# Patient Record
Sex: FEMALE | Race: WHITE | Employment: FULL TIME | URBAN - METROPOLITAN AREA
[De-identification: names, ages, dates, MRNs, and addresses within clinical notes are randomized per-mention and may not be internally consistent; named-entity substitution may affect disease eponyms.]

---

## 2017-04-17 PROBLEM — Z34.00 SUPERVISION OF NORMAL FIRST PREGNANCY (HCC): Status: ACTIVE | Noted: 2017-04-17

## 2017-04-17 PROBLEM — Z34.00 SUPERVISION OF NORMAL FIRST PREGNANCY: Status: ACTIVE | Noted: 2017-04-17

## 2017-04-17 PROCEDURE — 86762 RUBELLA ANTIBODY: CPT | Performed by: OBSTETRICS & GYNECOLOGY

## 2017-04-17 PROCEDURE — 87340 HEPATITIS B SURFACE AG IA: CPT | Performed by: OBSTETRICS & GYNECOLOGY

## 2017-04-17 PROCEDURE — 85025 COMPLETE CBC W/AUTO DIFF WBC: CPT | Performed by: OBSTETRICS & GYNECOLOGY

## 2017-04-17 PROCEDURE — 87389 HIV-1 AG W/HIV-1&-2 AB AG IA: CPT | Performed by: OBSTETRICS & GYNECOLOGY

## 2017-04-17 PROCEDURE — 36415 COLL VENOUS BLD VENIPUNCTURE: CPT | Performed by: OBSTETRICS & GYNECOLOGY

## 2017-04-17 PROCEDURE — 86850 RBC ANTIBODY SCREEN: CPT | Performed by: OBSTETRICS & GYNECOLOGY

## 2017-04-17 PROCEDURE — 87086 URINE CULTURE/COLONY COUNT: CPT | Performed by: OBSTETRICS & GYNECOLOGY

## 2017-04-17 PROCEDURE — 81405 MOPATH PROCEDURE LEVEL 6: CPT | Performed by: OBSTETRICS & GYNECOLOGY

## 2017-04-17 PROCEDURE — 86901 BLOOD TYPING SEROLOGIC RH(D): CPT | Performed by: OBSTETRICS & GYNECOLOGY

## 2017-04-17 PROCEDURE — 86780 TREPONEMA PALLIDUM: CPT | Performed by: OBSTETRICS & GYNECOLOGY

## 2017-04-17 PROCEDURE — 87591 N.GONORRHOEAE DNA AMP PROB: CPT | Performed by: OBSTETRICS & GYNECOLOGY

## 2017-04-17 PROCEDURE — 86900 BLOOD TYPING SEROLOGIC ABO: CPT | Performed by: OBSTETRICS & GYNECOLOGY

## 2017-04-17 PROCEDURE — 87491 CHLMYD TRACH DNA AMP PROBE: CPT | Performed by: OBSTETRICS & GYNECOLOGY

## 2017-05-10 PROBLEM — D56.0 ALPHA THALASSEMIA (HCC): Status: ACTIVE | Noted: 2017-05-10

## 2017-08-28 PROCEDURE — 82950 GLUCOSE TEST: CPT | Performed by: OBSTETRICS & GYNECOLOGY

## 2017-08-28 PROCEDURE — 86780 TREPONEMA PALLIDUM: CPT | Performed by: OBSTETRICS & GYNECOLOGY

## 2017-09-18 PROCEDURE — 81403 MOPATH PROCEDURE LEVEL 4: CPT | Performed by: OBSTETRICS & GYNECOLOGY

## 2017-09-18 PROCEDURE — 81404 MOPATH PROCEDURE LEVEL 5: CPT | Performed by: OBSTETRICS & GYNECOLOGY

## 2017-10-12 PROBLEM — D56.3 BETA THALASSEMIA TRAIT: Status: ACTIVE | Noted: 2017-10-12

## 2017-10-26 PROCEDURE — 87081 CULTURE SCREEN ONLY: CPT | Performed by: OBSTETRICS & GYNECOLOGY

## 2017-10-28 PROBLEM — O99.820 GBS (GROUP B STREPTOCOCCUS CARRIER), +RV CULTURE, CURRENTLY PREGNANT (HCC): Status: ACTIVE | Noted: 2017-10-28

## 2017-10-28 PROBLEM — O99.820 GBS (GROUP B STREPTOCOCCUS CARRIER), +RV CULTURE, CURRENTLY PREGNANT: Status: ACTIVE | Noted: 2017-10-28

## 2017-11-25 ENCOUNTER — HOSPITAL ENCOUNTER (INPATIENT)
Facility: HOSPITAL | Age: 27
LOS: 2 days | Discharge: HOME | End: 2017-11-27
Attending: OBSTETRICS & GYNECOLOGY | Admitting: OBSTETRICS & GYNECOLOGY
Payer: COMMERCIAL

## 2017-11-25 PROBLEM — Z34.90 PREGNANCY: Status: ACTIVE | Noted: 2017-11-25

## 2017-11-25 PROBLEM — Z34.90 PREGNANCY (HCC): Status: ACTIVE | Noted: 2017-11-25

## 2017-11-25 PROCEDURE — A4216 STERILE WATER/SALINE, 10 ML: HCPCS | Performed by: OBSTETRICS & GYNECOLOGY

## 2017-11-25 PROCEDURE — 86780 TREPONEMA PALLIDUM: CPT | Performed by: OBSTETRICS & GYNECOLOGY

## 2017-11-25 PROCEDURE — 86900 BLOOD TYPING SEROLOGIC ABO: CPT | Performed by: OBSTETRICS & GYNECOLOGY

## 2017-11-25 PROCEDURE — 86901 BLOOD TYPING SEROLOGIC RH(D): CPT | Performed by: OBSTETRICS & GYNECOLOGY

## 2017-11-25 PROCEDURE — 0KQM0ZZ REPAIR PERINEUM MUSCLE, OPEN APPROACH: ICD-10-PCS | Performed by: OBSTETRICS & GYNECOLOGY

## 2017-11-25 PROCEDURE — 81002 URINALYSIS NONAUTO W/O SCOPE: CPT

## 2017-11-25 PROCEDURE — 85027 COMPLETE CBC AUTOMATED: CPT | Performed by: OBSTETRICS & GYNECOLOGY

## 2017-11-25 PROCEDURE — 86850 RBC ANTIBODY SCREEN: CPT | Performed by: OBSTETRICS & GYNECOLOGY

## 2017-11-25 RX ORDER — HYDROMORPHONE HYDROCHLORIDE 1 MG/ML
1 INJECTION, SOLUTION INTRAMUSCULAR; INTRAVENOUS; SUBCUTANEOUS EVERY 2 HOUR PRN
Status: DISCONTINUED | OUTPATIENT
Start: 2017-11-25 | End: 2017-11-27

## 2017-11-25 RX ORDER — DOCUSATE SODIUM 100 MG/1
100 CAPSULE, LIQUID FILLED ORAL
Status: DISCONTINUED | OUTPATIENT
Start: 2017-11-25 | End: 2017-11-27

## 2017-11-25 RX ORDER — TRISODIUM CITRATE DIHYDRATE AND CITRIC ACID MONOHYDRATE 500; 334 MG/5ML; MG/5ML
30 SOLUTION ORAL AS NEEDED
Status: DISCONTINUED | OUTPATIENT
Start: 2017-11-25 | End: 2017-11-25 | Stop reason: HOSPADM

## 2017-11-25 RX ORDER — ACETAMINOPHEN 325 MG/1
650 TABLET ORAL EVERY 4 HOURS PRN
Status: DISCONTINUED | OUTPATIENT
Start: 2017-11-25 | End: 2017-11-27

## 2017-11-25 RX ORDER — ZOLPIDEM TARTRATE 5 MG/1
5 TABLET ORAL NIGHTLY PRN
Status: DISCONTINUED | OUTPATIENT
Start: 2017-11-25 | End: 2017-11-27

## 2017-11-25 RX ORDER — TERBUTALINE SULFATE 1 MG/ML
0.25 INJECTION, SOLUTION SUBCUTANEOUS AS NEEDED
Status: DISCONTINUED | OUTPATIENT
Start: 2017-11-25 | End: 2017-11-25 | Stop reason: HOSPADM

## 2017-11-25 RX ORDER — IBUPROFEN 600 MG/1
600 TABLET ORAL ONCE AS NEEDED
Status: DISCONTINUED | OUTPATIENT
Start: 2017-11-25 | End: 2017-11-25 | Stop reason: HOSPADM

## 2017-11-25 RX ORDER — IBUPROFEN 600 MG/1
600 TABLET ORAL EVERY 6 HOURS
Status: DISCONTINUED | OUTPATIENT
Start: 2017-11-25 | End: 2017-11-27

## 2017-11-25 RX ORDER — SODIUM CHLORIDE, SODIUM LACTATE, POTASSIUM CHLORIDE, CALCIUM CHLORIDE 600; 310; 30; 20 MG/100ML; MG/100ML; MG/100ML; MG/100ML
INJECTION, SOLUTION INTRAVENOUS CONTINUOUS
Status: DISCONTINUED | OUTPATIENT
Start: 2017-11-25 | End: 2017-11-25 | Stop reason: HOSPADM

## 2017-11-25 RX ORDER — EPHEDRINE SULFATE 50 MG/ML
5 INJECTION, SOLUTION INTRAVENOUS AS NEEDED
Status: DISCONTINUED | OUTPATIENT
Start: 2017-11-25 | End: 2017-11-25

## 2017-11-25 RX ORDER — AMPICILLIN 1 G/1
1 INJECTION, POWDER, FOR SOLUTION INTRAMUSCULAR; INTRAVENOUS EVERY 4 HOURS
Status: DISCONTINUED | OUTPATIENT
Start: 2017-11-25 | End: 2017-11-25 | Stop reason: HOSPADM

## 2017-11-25 RX ORDER — BISACODYL 10 MG
10 SUPPOSITORY, RECTAL RECTAL ONCE AS NEEDED
Status: ACTIVE | OUTPATIENT
Start: 2017-11-25 | End: 2017-11-25

## 2017-11-25 RX ORDER — SIMETHICONE 80 MG
80 TABLET,CHEWABLE ORAL 3 TIMES DAILY PRN
Status: DISCONTINUED | OUTPATIENT
Start: 2017-11-25 | End: 2017-11-27

## 2017-11-25 RX ORDER — NALBUPHINE HCL 10 MG/ML
2.5 AMPUL (ML) INJECTION
Status: DISCONTINUED | OUTPATIENT
Start: 2017-11-25 | End: 2017-11-27

## 2017-11-25 RX ORDER — HYDROCODONE BITARTRATE AND ACETAMINOPHEN 5; 325 MG/1; MG/1
1 TABLET ORAL EVERY 4 HOURS PRN
Status: DISCONTINUED | OUTPATIENT
Start: 2017-11-25 | End: 2017-11-27

## 2017-11-25 RX ORDER — HYDROCODONE BITARTRATE AND ACETAMINOPHEN 5; 325 MG/1; MG/1
2 TABLET ORAL EVERY 4 HOURS PRN
Status: DISCONTINUED | OUTPATIENT
Start: 2017-11-25 | End: 2017-11-27

## 2017-11-25 RX ORDER — DEXTROSE, SODIUM CHLORIDE, SODIUM LACTATE, POTASSIUM CHLORIDE, AND CALCIUM CHLORIDE 5; .6; .31; .03; .02 G/100ML; G/100ML; G/100ML; G/100ML; G/100ML
INJECTION, SOLUTION INTRAVENOUS AS NEEDED
Status: DISCONTINUED | OUTPATIENT
Start: 2017-11-25 | End: 2017-11-25 | Stop reason: HOSPADM

## 2017-11-25 NOTE — PLAN OF CARE
Pt is a  at 40.2 weeks here for r/o labor. Pt states contractions started yesterday and have been getting stronger. Pt denies any pregnancy/medical complications. Pt states she is GBBS+. Denies leaking of fluid or vaginal bleeding.  at bedside.

## 2017-11-25 NOTE — PLAN OF CARE
Dr Michelle Blake updated on patient status. Orders rec'd. Plan of care discussed with patient, patient verbalizes understanding and is in agreement.

## 2017-11-25 NOTE — PROGRESS NOTES
SUBJECTIVE:   pt without complaints. Comfortable s/p epidural    OBJECTIVE:  VS:  height is 5' 4\" (1.626 m) and weight is 131 lb (59.4 kg). Her temporal temperature is 98 °F (36.7 °C). Her blood pressure is 97/54 and her pulse is 77.  Her respiration is 1

## 2017-11-25 NOTE — H&P
[address] and Delivery Prenatal History and Physical Interval Addendum  Please see full Prenatal Record for this pregnancy      SUBJECTIVE:    Interval History:      This is a pregnancy at 40 weeks admitted for early labor  Contractions increas

## 2017-11-25 NOTE — PROGRESS NOTES
Report received from [facility]. Patient resting comfortably in bed. POC discussed with patient. All questions answered. Patient verbalized understanding. Call light within reach.

## 2017-11-25 NOTE — L&D DELIVERY NOTE
Foreign Grew, Girl  [[id number]]     Labor Events     labor?:  No    steroids?:  None   Antibiotics received during labor?:  Yes   Antibiotics (enter # doses in comment):  ampicillin         Rupture type:   Intact   Fluid color:  Meconium   Inductio Appearance:  Intact   Disposition:  held for future pathology          Apgars    Living status:  Living   Apgar Scoring Key:     0 1 2    Skin color Blue or pale Acrocyanotic Completely pink    Heart rate Absent <100 bpm >100 bpm    Reflex irritability N

## 2017-11-25 NOTE — PROGRESS NOTES
Report received from [facility] and endorsed to [employer].   Pt unable to void prior to transfer to /b.

## 2017-11-26 PROCEDURE — 85025 COMPLETE CBC W/AUTO DIFF WBC: CPT | Performed by: OBSTETRICS & GYNECOLOGY

## 2017-11-26 NOTE — PROGRESS NOTES
S: pt without complaints.   Lochia light  O: VS-Temp:  [97.8 °F (36.6 °C)-98.7 °F (37.1 °C)] 97.8 °F (36.6 °C)  Pulse:  [] 92  Resp:  [16-18] 18  BP: ()/(54-91) 125/91       Abdomen- soft ND NT, uterus firm and contracted       Extremities- 1+ e

## 2017-11-26 NOTE — PROGRESS NOTES
Patient admitted to MB via Candelario Fernando to room  Safety precautions initiated  Bed in low position  Call light in reach  Knows to call for assistance first time out of bed

## 2017-11-27 VITALS
TEMPERATURE: 98 F | HEART RATE: 60 BPM | WEIGHT: 131 LBS | RESPIRATION RATE: 18 BRPM | BODY MASS INDEX: 22.36 KG/M2 | HEIGHT: 64 IN | SYSTOLIC BLOOD PRESSURE: 115 MMHG | DIASTOLIC BLOOD PRESSURE: 76 MMHG | OXYGEN SATURATION: 95 %

## 2017-11-27 NOTE — PLAN OF CARE
POSTPARTUM    • Long Term Goal:Experiences normal postpartum course Adequate for Discharge    • Optimize infant feeding at the breast Adequate for Discharge    • Appropriate maternal -  bonding Adequate for Discharge        WILL DISCUSS PLAN OF CARE

## 2017-11-27 NOTE — PROGRESS NOTES
Postpartum Progress Note    SUBJECTIVE:  Postpartum day #2 s/p     No overnight events. Patient doing well without complaints. Ambulating, tolerating PO, voiding, pain well controlled.       OBJECTIVE:    Vital signs in last 24 hours:  Temp:  [97.9 °F

## 2017-11-29 ENCOUNTER — TELEPHONE (OUTPATIENT)
Dept: OBGYN UNIT | Facility: HOSPITAL | Age: 27
End: 2017-11-29

## 2017-11-29 NOTE — PROGRESS NOTES
Reviewed self and infant care w / mom, she verbalizes understanding of instructions reviewed. Encourage to follow up w/ MDs as directed and w/ questions/concerns.  Enc to go to ct

## 2017-12-01 PROBLEM — O99.820 GBS (GROUP B STREPTOCOCCUS CARRIER), +RV CULTURE, CURRENTLY PREGNANT (HCC): Status: RESOLVED | Noted: 2017-10-28 | Resolved: 2017-12-01

## 2017-12-01 PROBLEM — O99.820 GBS (GROUP B STREPTOCOCCUS CARRIER), +RV CULTURE, CURRENTLY PREGNANT: Status: RESOLVED | Noted: 2017-10-28 | Resolved: 2017-12-01

## 2017-12-01 PROBLEM — Z34.00 SUPERVISION OF NORMAL FIRST PREGNANCY (HCC): Status: RESOLVED | Noted: 2017-04-17 | Resolved: 2017-12-01

## 2017-12-01 PROBLEM — D56.0 ALPHA THALASSEMIA (HCC): Status: RESOLVED | Noted: 2017-05-10 | Resolved: 2017-12-01

## 2017-12-01 PROBLEM — Z34.00 SUPERVISION OF NORMAL FIRST PREGNANCY: Status: RESOLVED | Noted: 2017-04-17 | Resolved: 2017-12-01

## 2017-12-17 ENCOUNTER — NURSE ONLY (OUTPATIENT)
Dept: LACTATION | Facility: HOSPITAL | Age: 27
End: 2017-12-17
Attending: OBSTETRICS & GYNECOLOGY
Payer: COMMERCIAL

## 2017-12-17 PROCEDURE — 99213 OFFICE O/P EST LOW 20 MIN: CPT

## 2018-01-03 ENCOUNTER — NURSE ONLY (OUTPATIENT)
Dept: LACTATION | Facility: HOSPITAL | Age: 28
End: 2018-01-03
Attending: OBSTETRICS & GYNECOLOGY
Payer: COMMERCIAL

## 2018-01-03 DIAGNOSIS — O92.5 SUPPRESSED LACTATION, POSTPARTUM CONDITION OR COMPLICATION: Primary | ICD-10-CM

## 2018-01-03 PROCEDURE — 99213 OFFICE O/P EST LOW 20 MIN: CPT

## 2018-01-03 PROCEDURE — 99212 OFFICE O/P EST SF 10 MIN: CPT

## 2018-01-03 NOTE — PROGRESS NOTES
LACTATION NOTE - MOTHER      Evaluation Type: Outpatient Follow Up    Problems identified  Problems identified: Knowledge deficit; Nipple pain;Milk supply not WNL; Inverted nipple(s)  Milk supply not WNL: Hypogalactia  Problems Identified Other:  (Infant sti

## 2018-01-03 NOTE — PATIENT INSTRUCTIONS
Suggestions from today's visit:    Today's weight: 9 lb 11 oz, a gain from 8 lb 9 oz from our last visit on 12/17/17. Aurelia Noguera has increased her intake of breast milk from directly nursing since our last visit.  At today's visit, her intake was a total of

## 2018-01-10 PROBLEM — Z34.90 PREGNANCY: Status: RESOLVED | Noted: 2017-11-25 | Resolved: 2018-01-10

## 2018-01-10 PROBLEM — Z34.90 PREGNANCY (HCC): Status: RESOLVED | Noted: 2017-11-25 | Resolved: 2018-01-10

## 2020-03-31 PROBLEM — Z34.80 SUPERVISION OF OTHER NORMAL PREGNANCY, ANTEPARTUM (HCC): Status: ACTIVE | Noted: 2020-03-31

## 2020-03-31 PROBLEM — Z34.80 SUPERVISION OF OTHER NORMAL PREGNANCY, ANTEPARTUM: Status: ACTIVE | Noted: 2020-03-31

## 2020-08-20 PROBLEM — O99.013 ANEMIA DURING PREGNANCY IN THIRD TRIMESTER (HCC): Status: ACTIVE | Noted: 2020-08-20

## 2020-08-20 PROBLEM — O99.013 ANEMIA DURING PREGNANCY IN THIRD TRIMESTER: Status: ACTIVE | Noted: 2020-08-20

## 2020-10-12 DIAGNOSIS — Z34.90 PREGNANCY: Primary | ICD-10-CM

## 2020-10-24 ENCOUNTER — ANESTHESIA (OUTPATIENT)
Dept: OBGYN UNIT | Facility: HOSPITAL | Age: 30
End: 2020-10-24
Payer: COMMERCIAL

## 2020-10-24 ENCOUNTER — ANESTHESIA EVENT (OUTPATIENT)
Dept: OBGYN UNIT | Facility: HOSPITAL | Age: 30
End: 2020-10-24
Payer: COMMERCIAL

## 2020-10-24 ENCOUNTER — HOSPITAL ENCOUNTER (INPATIENT)
Facility: HOSPITAL | Age: 30
LOS: 2 days | Discharge: HOME | End: 2020-10-26
Attending: OBSTETRICS & GYNECOLOGY | Admitting: OBSTETRICS & GYNECOLOGY
Payer: COMMERCIAL

## 2020-10-24 PROBLEM — Z34.90 PREGNANCY (HCC): Status: ACTIVE | Noted: 2020-10-24

## 2020-10-24 PROBLEM — Z34.90 PREGNANCY: Status: ACTIVE | Noted: 2020-10-24

## 2020-10-24 PROCEDURE — 99214 OFFICE O/P EST MOD 30 MIN: CPT

## 2020-10-24 PROCEDURE — 86900 BLOOD TYPING SEROLOGIC ABO: CPT | Performed by: OBSTETRICS & GYNECOLOGY

## 2020-10-24 PROCEDURE — 86901 BLOOD TYPING SEROLOGIC RH(D): CPT | Performed by: OBSTETRICS & GYNECOLOGY

## 2020-10-24 PROCEDURE — 86850 RBC ANTIBODY SCREEN: CPT | Performed by: OBSTETRICS & GYNECOLOGY

## 2020-10-24 PROCEDURE — 86780 TREPONEMA PALLIDUM: CPT | Performed by: OBSTETRICS & GYNECOLOGY

## 2020-10-24 PROCEDURE — 85025 COMPLETE CBC W/AUTO DIFF WBC: CPT | Performed by: OBSTETRICS & GYNECOLOGY

## 2020-10-24 RX ORDER — TERBUTALINE SULFATE 1 MG/ML
0.25 INJECTION, SOLUTION SUBCUTANEOUS AS NEEDED
Status: DISCONTINUED | OUTPATIENT
Start: 2020-10-24 | End: 2020-10-24

## 2020-10-24 RX ORDER — BUPIVACAINE HCL/0.9 % NACL/PF 0.25 %
5 PLASTIC BAG, INJECTION (ML) EPIDURAL AS NEEDED
Status: DISCONTINUED | OUTPATIENT
Start: 2020-10-24 | End: 2020-10-24

## 2020-10-24 RX ORDER — SODIUM CHLORIDE, SODIUM LACTATE, POTASSIUM CHLORIDE, CALCIUM CHLORIDE 600; 310; 30; 20 MG/100ML; MG/100ML; MG/100ML; MG/100ML
INJECTION, SOLUTION INTRAVENOUS CONTINUOUS
Status: DISCONTINUED | OUTPATIENT
Start: 2020-10-24 | End: 2020-10-24

## 2020-10-24 RX ORDER — BISACODYL 10 MG
10 SUPPOSITORY, RECTAL RECTAL ONCE AS NEEDED
Status: DISCONTINUED | OUTPATIENT
Start: 2020-10-24 | End: 2020-10-26

## 2020-10-24 RX ORDER — TRISODIUM CITRATE DIHYDRATE AND CITRIC ACID MONOHYDRATE 500; 334 MG/5ML; MG/5ML
30 SOLUTION ORAL AS NEEDED
Status: DISCONTINUED | OUTPATIENT
Start: 2020-10-24 | End: 2020-10-24

## 2020-10-24 RX ORDER — DIPHENHYDRAMINE HYDROCHLORIDE 50 MG/ML
12.5 INJECTION INTRAMUSCULAR; INTRAVENOUS EVERY 4 HOURS PRN
Status: DISCONTINUED | OUTPATIENT
Start: 2020-10-24 | End: 2020-10-24

## 2020-10-24 RX ORDER — ACETAMINOPHEN 500 MG
500 TABLET ORAL EVERY 6 HOURS PRN
Status: DISCONTINUED | OUTPATIENT
Start: 2020-10-24 | End: 2020-10-24

## 2020-10-24 RX ORDER — HYDROCODONE BITARTRATE AND ACETAMINOPHEN 5; 325 MG/1; MG/1
1 TABLET ORAL ONCE
Status: COMPLETED | OUTPATIENT
Start: 2020-10-24 | End: 2020-10-24

## 2020-10-24 RX ORDER — IBUPROFEN 600 MG/1
600 TABLET ORAL EVERY 6 HOURS PRN
Status: DISCONTINUED | OUTPATIENT
Start: 2020-10-24 | End: 2020-10-24

## 2020-10-24 RX ORDER — AMMONIA INHALANTS 0.04 G/.3ML
0.3 INHALANT RESPIRATORY (INHALATION) AS NEEDED
Status: DISCONTINUED | OUTPATIENT
Start: 2020-10-24 | End: 2020-10-24

## 2020-10-24 RX ORDER — IBUPROFEN 600 MG/1
600 TABLET ORAL EVERY 6 HOURS
Status: DISCONTINUED | OUTPATIENT
Start: 2020-10-24 | End: 2020-10-26

## 2020-10-24 RX ORDER — ACETAMINOPHEN 325 MG/1
650 TABLET ORAL EVERY 6 HOURS PRN
Status: DISCONTINUED | OUTPATIENT
Start: 2020-10-24 | End: 2020-10-26

## 2020-10-24 RX ORDER — BUPIVACAINE HYDROCHLORIDE 2.5 MG/ML
INJECTION, SOLUTION EPIDURAL; INFILTRATION; INTRACAUDAL AS NEEDED
Status: DISCONTINUED | OUTPATIENT
Start: 2020-10-24 | End: 2020-10-24 | Stop reason: SURG

## 2020-10-24 RX ORDER — ONDANSETRON 2 MG/ML
4 INJECTION INTRAMUSCULAR; INTRAVENOUS EVERY 6 HOURS PRN
Status: DISCONTINUED | OUTPATIENT
Start: 2020-10-24 | End: 2020-10-24

## 2020-10-24 RX ORDER — DOCUSATE SODIUM 100 MG/1
100 CAPSULE, LIQUID FILLED ORAL
Status: DISCONTINUED | OUTPATIENT
Start: 2020-10-24 | End: 2020-10-26

## 2020-10-24 RX ORDER — SIMETHICONE 80 MG
80 TABLET,CHEWABLE ORAL 3 TIMES DAILY PRN
Status: DISCONTINUED | OUTPATIENT
Start: 2020-10-24 | End: 2020-10-26

## 2020-10-24 RX ORDER — DEXTROSE, SODIUM CHLORIDE, SODIUM LACTATE, POTASSIUM CHLORIDE, AND CALCIUM CHLORIDE 5; .6; .31; .03; .02 G/100ML; G/100ML; G/100ML; G/100ML; G/100ML
INJECTION, SOLUTION INTRAVENOUS AS NEEDED
Status: DISCONTINUED | OUTPATIENT
Start: 2020-10-24 | End: 2020-10-24

## 2020-10-24 RX ADMIN — BUPIVACAINE HYDROCHLORIDE 5 ML: 2.5 INJECTION, SOLUTION EPIDURAL; INFILTRATION; INTRACAUDAL at 18:09:00

## 2020-10-24 NOTE — H&P
[address] and Delivery Prenatal History and Physical Interval Addendum  Please see full Prenatal Record for this pregnancy      SUBJECTIVE:    Interval History:      This is a pregnancy at 40w0d weeks admitted for labor    OBJECTIVE:       Feta

## 2020-10-24 NOTE — ANESTHESIA PROCEDURE NOTES
Labor Analgesia  Performed by: Janeth Murcia MD  Authorized by: Janeth Murcia MD       General Information and Staff    Start Time:  10/24/2020 6:17 PM  End Time:  10/24/2020 6:30 PM  Anesthesiologist:  Janeth Murcia MD  Performed by:   Anesthesiologist  Ernie

## 2020-10-24 NOTE — ANESTHESIA PREPROCEDURE EVALUATION
PRE-OP EVALUATION    Patient Name: Mario Luis    Pre-op Diagnosis:  Intrauterine pregancy, in painful labor    For labor epidural analgesia  Pre-op vitals reviewed.   Temp: 97.3 °F (36.3 °C)  Pulse: 100  Resp: 18  BP: 124/76  SpO2: 100 %  Body mass index Cardiovascular    Negative cardiovascular ROS. Exercise tolerance: good     MET: >4                                           Endo/Other    Negative endo/other ROS. Pulmonary    Negative pulmonary ROS. failure of pain relief. Implied distant risks include spinal hematoma and infection. Understanding of risks is demonstrated, and wishes proceed with labor analgesia.   Plan/risks discussed with: patient and spouse                Present on Admission:  **N

## 2020-10-25 PROCEDURE — 85025 COMPLETE CBC W/AUTO DIFF WBC: CPT | Performed by: OBSTETRICS & GYNECOLOGY

## 2020-10-25 NOTE — PROGRESS NOTES
Labor Analgesia Follow Up Note    Patient underwent epidural anesthesia for labor analgesia,    Placenta Date/Time: 10/24/2020  7:00 PM    Delivery Date/Time[de-identified] 10/24/2020  6:58 PM    /73 (BP Location: Left arm)   Pulse 88   Temp 98.3 °F (36.8 °C) (Or

## 2020-10-25 NOTE — PROGRESS NOTES
Postpartum Day 1    Pt without complaints.     Temp:  [97.3 °F (36.3 °C)-98.3 °F (36.8 °C)] 97.9 °F (36.6 °C)  Pulse:  [] 58  Resp:  [16-18] 16  BP: (105-129)/(57-90) 114/77  abd  soft, NT, ND, fundus firm below umbilicus  perineum NL lochia  extr  tr

## 2020-10-25 NOTE — PROGRESS NOTES
Patient admitted via wheelchair to room. ID bands cross matched with L&D rn. Oriented to room. Safety precautions initiated. Bed in low position Call light in reach. D/C teachings initiated bedside.  Plan of care discussed Pt told to call for asst when juan

## 2020-10-25 NOTE — PLAN OF CARE
Problem: Patient/Family Goals  Goal: Patient/Family Long Term Goal  Description: Patient's [address]  - See additional Care Plan goals for specific interventions  Outcome: Completed  Goal: Patient/Family Short Term Goal  Description: José Assess fundus and lochia. - Provide ice/sitz baths for perineum discomfort. - Monitor healing of incision/episiotomy/laceration, and assess for signs and symptoms of infection and hematoma. - Assess bladder function and monitor for bladder distention. pumping equipment/supplies, instructions and assistance, as needed. - Encourage rooming-in and breast feeding on demand.  - Encourage skin-to-skin contact. - Provide LC support as needed. - Assess for and manage engorgement.   - Provide breast feeding ed

## 2020-10-25 NOTE — ADDENDUM NOTE
Addendum  created 10/24/20 2054 by Catrina Gamino MD    Clinical Note Signed, Intraprocedure Blocks edited

## 2020-10-25 NOTE — L&D DELIVERY NOTE
Bashir Basilio, Girl [[id number]]    Labor Events     labor?: No   steroids?: None  Antibiotics received during labor?: No  Antibiotics (enter # doses in comment): none  Rupture date/time: 10/24/2020 1843     Rupture type: AROM  Fluid color: Clear  I motion    Respiratory effort Absent Weak cry; hypoventilation Good, crying              1 Minute:  5 Minute:  10 Minute:  15 Minute:  20 Minute:    Skin color: 1  1       Heart rate: 2  2       Reflex irritablity: 2  2       Muscle tone: 2  2       Respira

## 2020-10-26 VITALS
WEIGHT: 163.63 LBS | DIASTOLIC BLOOD PRESSURE: 68 MMHG | RESPIRATION RATE: 17 BRPM | TEMPERATURE: 98 F | HEIGHT: 64.02 IN | HEART RATE: 69 BPM | OXYGEN SATURATION: 100 % | SYSTOLIC BLOOD PRESSURE: 119 MMHG | BODY MASS INDEX: 27.93 KG/M2

## 2020-10-26 NOTE — PROGRESS NOTES
Discharge patient home as order. Teaching complete, patient feel comfortable to take care herself and  infant. Hugs and kisses off. Sent both mom and infant to their family car @ 5092.

## 2020-10-26 NOTE — PLAN OF CARE
Problem: PAIN - ADULT  Goal: Verbalizes/displays adequate comfort level or patient's stated pain goal  Description: INTERVENTIONS:  - Encourage pt to monitor pain and request assistance  - Assess pain using appropriate pain scale  - Administer analgesics Progressing  Goal: Optimize infant feeding at the breast  Description: INTERVENTIONS:  - Initiate breast feeding within first hour after birth. - Monitor effectiveness of current breast feeding efforts.   - Assess support systems available to mother/famil

## 2020-10-26 NOTE — PROGRESS NOTES
[facility]  Post-Partum Vaginal Delivery Progress Note    Rolene Richard Patient Status:  Inpatient    1990 MRN [id number]   [facility] Attending Toshia John MD   Hosp Day # 2 PCP Clarisse Tello MD     SUBJECTIVE:    Postp

## 2020-10-28 ENCOUNTER — TELEPHONE (OUTPATIENT)
Dept: OBGYN UNIT | Facility: HOSPITAL | Age: 30
End: 2020-10-28

## 2021-02-06 ENCOUNTER — IMMUNIZATION (OUTPATIENT)
Dept: LAB | Age: 31
End: 2021-02-06
Attending: HOSPITALIST
Payer: COMMERCIAL

## 2021-02-06 DIAGNOSIS — Z23 NEED FOR VACCINATION: Primary | ICD-10-CM

## 2021-02-06 PROCEDURE — 0001A SARSCOV2 VAC 30MCG/0.3ML IM: CPT

## 2021-02-28 ENCOUNTER — IMMUNIZATION (OUTPATIENT)
Dept: LAB | Age: 31
End: 2021-02-28
Attending: PREVENTIVE MEDICINE
Payer: COMMERCIAL

## 2021-02-28 DIAGNOSIS — Z23 NEED FOR VACCINATION: Primary | ICD-10-CM

## 2021-02-28 PROCEDURE — 0002A SARSCOV2 VAC 30MCG/0.3ML IM: CPT

## 2022-01-10 NOTE — PATIENT INSTRUCTIONS
Malissa's weight today in diaper only was 8 lbs 9.4 oz  Intake at breast was 24 ml, from left and 26 ml from right = 50 ml (1 2/3 oz)  At her current weight, it is expected that she would need about 2 1/2 to 3 ounces per feeding.   Goals from today's visit a most moms is 10-12 minutes, but pumping times may vary slightly. • A short pumping is better than no pumping!   • If you have time you can “cluster pump” like a baby cluster feeds at times – pump for ten minutes, rest for ten minutes, then repeat 2-3 times to look at while you pump  • Inhale the scent of your baby from something your baby wore. • Sit back, close your eyes and imagine how sweet and soft your baby is; imagine “flowing things” like waterfalls, white rivers. • Listen to relaxing music.  There i 8

## 2023-04-29 ENCOUNTER — HOSPITAL ENCOUNTER (OUTPATIENT)
Age: 33
Discharge: HOME | End: 2023-04-29
Payer: COMMERCIAL

## 2023-04-29 VITALS
TEMPERATURE: 98 F | HEART RATE: 89 BPM | DIASTOLIC BLOOD PRESSURE: 79 MMHG | RESPIRATION RATE: 20 BRPM | SYSTOLIC BLOOD PRESSURE: 124 MMHG | OXYGEN SATURATION: 98 %

## 2023-04-29 DIAGNOSIS — J02.9 VIRAL PHARYNGITIS: Primary | ICD-10-CM

## 2023-04-29 LAB — S PYO AG THROAT QL: NEGATIVE

## 2023-04-29 NOTE — DISCHARGE INSTRUCTIONS
Please take acetaminophen (Tylenol) 650-1000 mg every 6 hours for fever/pain  OR  Ibuprofen (Motrin, Advil) 600 mg every 6 hours for fever/pain, with food  Warm fluids  Throat drops/lozenges e.g. Halls, Cepacol  Warm salt water gargles (1/2 teaspoon of salt to 8 oz of warm water)  Return for any new/worsening symptoms  See your primary care provider if no improvement in 1 week

## 2023-06-01 ENCOUNTER — HOSPITAL ENCOUNTER (OUTPATIENT)
Age: 33
Discharge: HOME | End: 2023-06-01
Payer: COMMERCIAL

## 2023-06-01 VITALS
OXYGEN SATURATION: 100 % | RESPIRATION RATE: 18 BRPM | TEMPERATURE: 98 F | SYSTOLIC BLOOD PRESSURE: 120 MMHG | HEART RATE: 66 BPM | DIASTOLIC BLOOD PRESSURE: 73 MMHG

## 2023-06-01 DIAGNOSIS — J02.9 VIRAL PHARYNGITIS: Primary | ICD-10-CM

## 2023-06-01 LAB — S PYO AG THROAT QL: NEGATIVE

## 2023-06-01 PROCEDURE — 87880 STREP A ASSAY W/OPTIC: CPT | Performed by: NURSE PRACTITIONER

## 2023-06-01 PROCEDURE — 99213 OFFICE O/P EST LOW 20 MIN: CPT | Performed by: NURSE PRACTITIONER

## 2023-12-28 LAB
HEPATITIS B SURFACE ANTIGEN OB: NEGATIVE
HIV RESULT OB: NEGATIVE
RAPID PLASMA REAGIN OB: NONREACTIVE

## 2024-02-15 ENCOUNTER — TELEPHONE (OUTPATIENT)
Dept: PERINATAL CARE | Facility: HOSPITAL | Age: 34
End: 2024-02-15

## 2024-02-15 NOTE — TELEPHONE ENCOUNTER
Returned pt call RE scheduling.  No answer  Left Voice mail to call back with [facility] number  [phone]

## 2024-03-29 NOTE — PROGRESS NOTES
Outpatient Maternal-Fetal Medicine Consultation    Dear Dr. Moncada,    Thank you for requesting ultrasound evaluation and maternal fetal medicine consultation on your patient Audrey Clemens.  As you are aware she is a 34 year old female  with a rogers pregnancy and an Estimated Date of Delivery: 24  A maternal-fetal medicine consultation was requested secondary to Advanced Maternal Age.  Her prenatal records and labs were reviewed.    Her first daughter has Usher type Ib syndrome.  Her hearing loss was diagnosed at 2 years and was progressive.  She has cochlear implants and is doing very well.    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  It is just something that is found on her labs.  ROS    HISTORY  OB History    Para Term  AB Living   3 2 2     2   SAB IAB Ectopic Multiple Live Births         0 2      # Outcome Date GA Lbr Levi/2nd Weight Sex Delivery Anes PTL Lv   3 Current            2 Term 10/24/20 40w0d 06:39 / 00:19 7 lb 5.5 oz (3.33 kg) F NORMAL SPONT EPI N WILLIAM   1 Term 17 40w2d 11:24 / 01:40 7 lb 7.8 oz (3.395 kg) F NORMAL SPONT EPI N WILLIAM      Complications: Group B beta strep +       Allergies:  No Known Allergies   Current Meds:  Current Outpatient Medications   Medication Sig Dispense Refill    prenatal multivitamin plus DHA 27-0.8-228 MG Oral Cap Take 1 capsule by mouth daily.          HISTORY:  Past Medical History:   Diagnosis Date    Blood disorder     beta thalassemia    HEADACHES     controlled on OCPs    IBS     nausea/vomiting    Mental disorder     anxiety, depression. no meds since     Screening-pulmonary TB 2013    negative PPD       Past Surgical History:   Procedure Laterality Date    OTHER SURGICAL HISTORY      oral surgery    OTHER SURGICAL HISTORY      nasal surgery    UPPER GI ENDOSCOPY - REFERRAL      by Dr Ordaz- neg      Family History   Problem Relation Age of Onset    Other (Other) Sister         depression    Other  (Other) Father         dx thyroid d/o age 57 ?hyperactive is on po meds    Cancer Maternal Grandmother         breast CA dx approx age 60    Heart Disorder Maternal Grandfather         CABG age approx 75    Diabetes Other         great uncle    Other (Other) Other       Social History     Socioeconomic History    Marital status:     Number of children: 0   Occupational History    Occupation: [employer]     Comment: psych   Tobacco Use    Smoking status: Former    Smokeless tobacco: Never   Vaping Use    Vaping Use: Never used   Substance and Sexual Activity    Alcohol use: No     Alcohol/week: 0.0 standard drinks of alcohol    Drug use: No    Sexual activity: Not Currently     Partners: Male   Social History Narrative    Working    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         Diet: well balanced, gets enough fruits/veggies    Exercise: runs    Sleep: ok, gets about 6 hrs    Stress: managable--good support        Periods: regular    Last pap 2013 normal    No heavy bleeding/cramping              PHYSICAL EXAMINATION:  /69   Pulse 92   Wt 157 lb (71.2 kg)   BMI 26.94 kg/m²   Physical Exam  Constitutional:       Appearance: Normal appearance.   Abdominal:      Palpations: Abdomen is soft.      Tenderness: There is no abdominal tenderness.   Neurological:      Mental Status: She is alert.   Psychiatric:         Mood and Affect: Mood normal.         Behavior: Behavior normal.           OBSTETRIC ULTRASOUND  The patient had a level II ultrasound today which revealed size consistent with dates and a normal detailed anatomic survey.  Ultrasound Findings:  Single IUP in breech presentation.    Placenta is posterior, right.   A 3 vessel cord is noted.  Cardiac activity is present at 140 bpm   g ( 0 lb 15 oz);   MVP is 4.4 cm .     The fetal measurements are consistent with the established EDC. No ultrasound evidence of  structural abnormalities are seen today. The nasal bone is present. No ultrasound evidence of markers for aneuploidy are seen. She understands that ultrasound exam cannot exclude genetic abnormalities and that genetic testing is recommended. The limitations of ultrasound were discussed.     Uterus and adnexa appeared normal  today on US  See PACS/Imaging Tab For Complete Ultrasound Report  I interpreted the results and reviewed them with the patient.    DISCUSSION  During her visit we discussed and reviewed the following issues:  ADVANCED MATERNAL AGE    Background  I reviewed with the patient that pregnancies in women of advanced maternal age (35 or older at delivery) are associated with elevated risks. Specifically, there is a higher rate of:  Fetal malformations  Preeclampsia  Gestational diabetes  Intrauterine fetal death    As a result, enhanced pregnancy surveillance is advised for these patients including a comprehensive ultrasound to assess for fetal malformations (at 20 weeks) and a third trimester ultrasound assessment for fetal growth (at 32 weeks). In addition, weekly NST's (initiating at 36 weeks gestation for women 35-39 years and at 32 weeks gestation for women 40 years and older) are also advised. Routine obstetric care is more than adequate to assess for gestational diabetes and preeclampsia; hence, no further significant alterations in obstetric care are advised.    Medical Complications    Women 35 years of age or older can expect to experience two to three fold higher rates of hospitalization,  delivery, and pregnancy-related complications when compared to their younger counterparts.  The two most common medical problems complicating these  pregnanccies are hypertension and diabetes.   The incidence of preeclampsia in the general obstetric population is 3 to 4 percent; this increases to 5 to 10 percent in women over age 40 and is as high as 35 percent in women over age 50.   The incidence  of gestational diabetes in the general obstetric population is 3 percent, rising to 7 to 12 percent in women over age 40 and 20 percent in women over age 50.  Women 35 years of age or older are more likely to be delivered by . The  delivery rate in the general obstetric population of the [place] is almost 30 percent, compared to almost 50 percent in women over age 40 to 45 and almost 80 percent in women age 50 to 63.          Fetal Death        A decision analysis tool using data from the [place] [employer]  Database predicted a strategy of weekly antepartum testing and labor induction would lower the risk of unexplained fetal death in women 35 years of age or older. In this model, weekly testing starting at 36 weeks of gestation would drop the risk of fetal death from 5.2 to 1.3 per 1000 pregnancies. While a policy of antepartum testing in older women does increase the chance that a women will be induced (71 inductions per fetal death averted) and thereby increases her risk of having a  delivery, only 14 additional cesareans would need to be performed to avert one unexplained fetal death.  Hence, weekly NST's are advised for women of advanced maternal age; testing should be initiated at 36 weeks for women 35-39 years and at 32 weeks for women 40 years and older.    Fetal Malformations    Cardiac malformations, clubfoot, and diaphragmatic hernia appear to occur with increased frequency in offspring of older women. These abnormalities are structural and unrelated to aneuploidy, thus they would not be detected by karyotype analysis.  For these reasons a complete, detailed ultrasound (level II) is advised even if the fetus has a normal karyotype.      Fetal Aneuploidy      Invasive Testing  I offered invasive genetic testing (amniocentesis, chorionic villus sampling) after reviewing the diagnostic accuracy of these tests as well as the procedure associated loss rate (1:500 for  genetic amniocentesis).    She ultimately does not desire invasive genetic testing.     We discussed  the increased risk of chromosomal abnormalities associated with advanced maternal age at age  34 year old. She understands that ultrasound exam cannot exclude potential genetic abnormalities.  Her estimated risk based on maternal age at term with any chromosome abnormality is about 1: 200 and with Down Syndrome is about 1: 350.   We also discussed the risks and benefits of having  genetic testing (CVS and amniocentesis) performed.      Non-invasive Pregnancy Testing (NIPT)  I reviewed current non-invasive screening options. Currently non-invasive pregnancy testing (NIPT) offers the highest detection rate (with the lowest false positive rate) for the detection of fetal aneuploidy amongst high-risk patients. The limitations of detailed mid-trimester sonography was reviewed with the patient. First trimester screening and second trimester multiple-marker serum serum screening as alternative aneuploidy screening options were also reviewed. However, both of these tests are associated with lower detection and higher false positive rates.              The hemoglobinopathies can be divided into two general types: the thalassemias and the hemoglobin variants (e.g., sickle cell anemia and hemoglobin C disease), or a combination of the two. Hemoglobinopathies are chronic, debilitating, and often fatal.  The lack of effective treatment underscores the need for prenatal testing for sickle cell disease and thalassemia.           In most fetal cases with a known hemoglobinopathy, prenatal testing does not alter obstetrical care, as these genetic disorders in the fetus typically have no untoward effects on the mother and fetus. One exception is homozygous alpha thalassemia (also known as hydrops fetalis) with the loss of all four alpha globin chains, which usually results in fetal death during the late second through mid-third  trimester of pregnancy. This may also be associated with maternal health risks of hypertension. The use of intrauterine transfusion has resulted in successful live births.                      DNA-based testing for sickle cell, and alpha and beta thalassemia can be performed during the first trimester of pregnancy on villae obtained by chorionic villus sampling(CVS), or by amniocentesis.    Preimplantation genetic diagnosis is also available.        If the results of the maternal hemoglobin electrophoresis demonstrate that the mother is either homozygous or a heterozygote carrier for hemoglobinopathy, an evaluation of the father is needed to assess fetal risk. This includes obtaining a paternal CBC and hemoglobin electrophoresis. As indicated above, a MCV<80 fL in the absence of iron deficiency suggests thalassemia.                Beta thalassemia minor (beta thalassemia trait) requires no specific therapy. Transfusions are occasionally required in pregnant women who may develop a more severe \"physiologic\" anemia of pregnancy.    Patients with beta thalassemia intermedia must be monitored closely for progression of their anemia, and/or the development of worsening evidence of the complications of hemolysis or extramedullary erythropoiesis. In the majority of these patients, chronic transfusion therapy will initially have to be implemented, occasionally as late as the third or fourth decade of life. Symptoms usually develop when the hemoglobin level falls below 7 g/dL, and transfusion may be required during periods of rapid growth, infection-associated aplastic crises, and pregnancy .   The level of iron stores in these patients should be monitored carefully by use of the serum ferritin.  Because the tannins in tea chelate iron in the gut and prevent its absorption, regular consumption of tea is encouraged.  With advances in hypertransfusion and iron chelation, some women with beta thalassemia major have had  favorable pregnancy outcomes. However, such pregnancies are recommended only in those with normal cardiac function and adequate hypertransfusion and iron chelation regimens.    Both parents are carriers for Usher syndrome.  Their first daughter is affected and has cochlear implants.  She is doing quite well.  I offered amniocentesis for diagnostic testing based on AMA and family history of an inherited congenital hearing loss.  They declined amniocentesis.      IMPRESSION:  IUP at 20w6d   AMA Declines aneuploidy screening and invasive testing.  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 Moncada  Follow-up Growth ultrasound with BPP at 32 weeks.  Weekly NST's at 36 weeks.        Thank you for allowing me to participate in the care of your patient.  Please do not hesitate to contact me if additional questions or concerns arise.      Melinda Sanches M.D.    40 minutes spent in review of records, patient consultation, documentation and coordination of care.  The relevant clinical matter(s) are summarized above.     Note to patient and family  The 21st Century Cures Act makes medical notes available to patients in the interest of transparency.  However, please be advised that this is a medical document.  It is intended as [id number]-ia-wkea communication.  It is written and medical language may contain abbreviations or verbiage that are technical and unfamiliar.  It may appear blunt or direct.  Medical documents are intended to carry relevant information, facts as evident, and the clinical opinion of the practitioner.

## 2024-04-01 ENCOUNTER — HOSPITAL ENCOUNTER (OUTPATIENT)
Dept: PERINATAL CARE | Facility: HOSPITAL | Age: 34
Discharge: HOME | End: 2024-04-01
Attending: OBSTETRICS & GYNECOLOGY
Payer: COMMERCIAL

## 2024-04-01 ENCOUNTER — HOSPITAL ENCOUNTER (OUTPATIENT)
Dept: PERINATAL CARE | Facility: HOSPITAL | Age: 34
End: 2024-04-01
Attending: OBSTETRICS & GYNECOLOGY
Payer: COMMERCIAL

## 2024-04-01 VITALS
WEIGHT: 157 LBS | HEART RATE: 92 BPM | SYSTOLIC BLOOD PRESSURE: 110 MMHG | BODY MASS INDEX: 27 KG/M2 | DIASTOLIC BLOOD PRESSURE: 69 MMHG

## 2024-04-01 DIAGNOSIS — O09.522 AMA (ADVANCED MATERNAL AGE) MULTIGRAVIDA 35+, SECOND TRIMESTER (HCC): Primary | ICD-10-CM

## 2024-04-01 DIAGNOSIS — O09.522 AMA (ADVANCED MATERNAL AGE) MULTIGRAVIDA 35+, SECOND TRIMESTER (HCC): ICD-10-CM

## 2024-04-01 DIAGNOSIS — Z82.2 FAMILY HISTORY OF CONGENITAL HEARING LOSS: ICD-10-CM

## 2024-04-01 DIAGNOSIS — D56.3 BETA THALASSEMIA TRAIT: ICD-10-CM

## 2024-04-01 PROCEDURE — 76811 OB US DETAILED SNGL FETUS: CPT | Performed by: OBSTETRICS & GYNECOLOGY

## 2024-05-15 LAB — HIV RESULT OB: NEGATIVE

## 2024-06-20 NOTE — PROGRESS NOTES
Outpatient Maternal-Fetal Medicine Follow-Up     Dear Dr. Moncada,     Thank you for requesting ultrasound evaluation and maternal fetal medicine consultation on your patient Audrey Clemens.  As you are aware she is a 34 year old female  with a Fernando pregnancy and an Estimated Date of Delivery: 24.  She returned to maternal-fetal medicine today for a follow-up visit.  Her history was reviewed from her prior visit and there were no interval changes.     Antepartum Risk Factors  Advanced maternal age  Previous child with Usher syndrome  Beta thalassemia trait      S: She reports good fetal movement.  She has no concerns or complaints.  She passed her GDM screen.    PHYSICAL EXAMINATION:  /66   Pulse 88   Wt 165 lb (74.8 kg)   BMI 28.31 kg/m²   General: alert and oriented, no acute distress  Abdomen: gravid, soft, non-tender  Extremities: non-tender, no edema     OBSTETRIC ULTRASOUND  The patient had a fetal growth ultrasound today which I interpreted the results and reviewed them with the patient.    Ultrasound Findings:  Single IUP in cephalic presentation.    Placenta is posterior.   A 3 vessel cord is noted.  Cardiac activity is present at 142 bpm  EFW 2070 g ( 4 lb 9 oz); 46%.    INDER is  9.4 cm.  MVP is 3.7 cm  BPP is 8/8.     The fetal measurements are consistent with established EDC. No gross ultrasound evidence of structural abnormalities are seen today. The patient understands that ultrasound cannot rule out all structural and chromosomal abnormalities.     See imaging tab for the complete US report.  DISCUSSION  During her visit we discussed and reviewed the following issues:  ADVANCED MATERNAL AGE   morbidity mortality associated with advancing maternal age was discussed previously and reviewed.  See [facility] note from 2024 for detailed review.  I reviewed with the patient that pregnancies in women of advanced maternal age (35 or older at delivery) are associated with elevated  risks. Specifically, there is a higher rate of:  Fetal malformations  Preeclampsia  Gestational diabetes  Intrauterine fetal death    As a result, enhanced pregnancy surveillance is advised for these patients including a comprehensive ultrasound to assess for fetal malformations (at 20 weeks) and a third trimester ultrasound assessment for fetal growth (at 32 weeks). In addition, weekly NST's (initiating at 36 weeks gestation for women 35-39 years and at 32 weeks gestation for women 40 years and older) are also advised. Routine obstetric care is more than adequate to assess for gestational diabetes and preeclampsia; hence, no further significant alterations in obstetric care are advised.      Beta thalassemia and other hemoglobinopathies were discussed previously and reviewed.  See [facility] note from 2024 for detailed review    Both parents are carriers for Usher syndrome.  Their first daughter is affected and has cochlear implants.  She is doing quite well.    They previously declined amniocentesis for prenatal genetic diagnosis in this pregnancy.    We reviewed the signs and symptoms of preeclampsia,  labor and monitoring fetal movement.  We discussed reasons for her to call her physician.    We discussed the recommended plan of care based on her  risk factors.  Audrey had her questions answered to her satisfaction      IMPRESSION:  IUP at 32w6d   Normal fetal growth and  testing  AMA Declines aneuploidy screening and invasive testing.  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 Moncada  Weekly NST's at 36 weeks.       Total time spent 20 minutes this calendar day which includes preparing to see the patient including chart review, obtaining and/or reviewing additional medical history, performing a physical exam and evaluation, documenting clinical information in the electronic medical record,  independently interpreting results, counseling the patient, communicating results to the patient/family/caregiver and coordinating care.     Case discussed with patient who demonstrated understanding and agreement with plan.     Thank you for allowing me to participate in the care of this patient.  Please feel free to contact me with any questions.    Isela Aparicio MD  Maternal-Fetal Medicine       Note to patient and family:  The 21st Century Cures Act makes medical notes available to patients in the interest of transparency.  However, please be advised that this is a medical document.  It is intended as a peer to peer communication.  It is written in medical language and may contain abbreviations or verbiage that are technical and unfamiliar.  It may appear blunt or direct.  Medical documents are intended to carry relevant information, facts as evident, and the clinical opinion of the practitioner.

## 2024-06-24 ENCOUNTER — HOSPITAL ENCOUNTER (OUTPATIENT)
Dept: PERINATAL CARE | Facility: HOSPITAL | Age: 34
End: 2024-06-24
Attending: OBSTETRICS & GYNECOLOGY

## 2024-06-24 ENCOUNTER — HOSPITAL ENCOUNTER (OUTPATIENT)
Dept: PERINATAL CARE | Facility: HOSPITAL | Age: 34
Discharge: HOME | End: 2024-06-24
Attending: OBSTETRICS & GYNECOLOGY

## 2024-06-24 VITALS
HEART RATE: 88 BPM | BODY MASS INDEX: 28 KG/M2 | DIASTOLIC BLOOD PRESSURE: 66 MMHG | SYSTOLIC BLOOD PRESSURE: 108 MMHG | WEIGHT: 165 LBS

## 2024-06-24 DIAGNOSIS — D56.3 BETA THALASSEMIA TRAIT: ICD-10-CM

## 2024-06-24 DIAGNOSIS — O09.523 AMA (ADVANCED MATERNAL AGE) MULTIGRAVIDA 35+, THIRD TRIMESTER (HCC): ICD-10-CM

## 2024-06-24 DIAGNOSIS — D56.3 BETA THALASSEMIA TRAIT: Primary | ICD-10-CM

## 2024-06-24 PROCEDURE — 76816 OB US FOLLOW-UP PER FETUS: CPT | Performed by: OBSTETRICS & GYNECOLOGY

## 2024-06-24 PROCEDURE — 76819 FETAL BIOPHYS PROFIL W/O NST: CPT

## 2024-07-16 LAB — STREP GP B CULT OB: POSITIVE

## 2024-08-06 ENCOUNTER — HOSPITAL ENCOUNTER (INPATIENT)
Facility: HOSPITAL | Age: 34
LOS: 2 days | Discharge: HOME | End: 2024-08-08
Attending: OBSTETRICS & GYNECOLOGY | Admitting: OBSTETRICS & GYNECOLOGY
Payer: COMMERCIAL

## 2024-08-06 ENCOUNTER — ANESTHESIA EVENT (OUTPATIENT)
Dept: OBGYN UNIT | Facility: HOSPITAL | Age: 34
End: 2024-08-06
Payer: COMMERCIAL

## 2024-08-06 ENCOUNTER — ANESTHESIA (OUTPATIENT)
Dept: OBGYN UNIT | Facility: HOSPITAL | Age: 34
End: 2024-08-06
Payer: COMMERCIAL

## 2024-08-06 LAB
ANTIBODY SCREEN: NEGATIVE
BASOPHILS # BLD AUTO: 0.03 X10(3) UL (ref 0–0.2)
BASOPHILS NFR BLD AUTO: 0.4 %
EOSINOPHIL # BLD AUTO: 0.28 X10(3) UL (ref 0–0.7)
EOSINOPHIL NFR BLD AUTO: 3.3 %
ERYTHROCYTE [DISTWIDTH] IN BLOOD BY AUTOMATED COUNT: 17.9 %
HCT VFR BLD AUTO: 30.7 %
HGB BLD-MCNC: 9.8 G/DL
IMM GRANULOCYTES # BLD AUTO: 0.07 X10(3) UL (ref 0–1)
IMM GRANULOCYTES NFR BLD: 0.8 %
LYMPHOCYTES # BLD AUTO: 1.99 X10(3) UL (ref 1–4)
LYMPHOCYTES NFR BLD AUTO: 23.3 %
MCH RBC QN AUTO: 21.4 PG (ref 26–34)
MCHC RBC AUTO-ENTMCNC: 31.9 G/DL (ref 31–37)
MCV RBC AUTO: 67 FL
MONOCYTES # BLD AUTO: 0.69 X10(3) UL (ref 0.1–1)
MONOCYTES NFR BLD AUTO: 8.1 %
NEUTROPHILS # BLD AUTO: 5.48 X10 (3) UL (ref 1.5–7.7)
NEUTROPHILS # BLD AUTO: 5.48 X10(3) UL (ref 1.5–7.7)
NEUTROPHILS NFR BLD AUTO: 64.1 %
PLATELET # BLD AUTO: 220 10(3)UL (ref 150–450)
PLATELETS.RETICULATED NFR BLD AUTO: 5.9 % (ref 0–7)
RBC # BLD AUTO: 4.58 X10(6)UL
RH BLOOD TYPE: POSITIVE
T PALLIDUM AB SER QL IA: NONREACTIVE
WBC # BLD AUTO: 8.5 X10(3) UL (ref 4–11)

## 2024-08-06 PROCEDURE — 99214 OFFICE O/P EST MOD 30 MIN: CPT

## 2024-08-06 PROCEDURE — 86780 TREPONEMA PALLIDUM: CPT | Performed by: OBSTETRICS & GYNECOLOGY

## 2024-08-06 PROCEDURE — 86850 RBC ANTIBODY SCREEN: CPT | Performed by: OBSTETRICS & GYNECOLOGY

## 2024-08-06 PROCEDURE — 86901 BLOOD TYPING SEROLOGIC RH(D): CPT | Performed by: OBSTETRICS & GYNECOLOGY

## 2024-08-06 PROCEDURE — 85025 COMPLETE CBC W/AUTO DIFF WBC: CPT | Performed by: OBSTETRICS & GYNECOLOGY

## 2024-08-06 PROCEDURE — 10907ZC DRAINAGE OF AMNIOTIC FLUID, THERAPEUTIC FROM PRODUCTS OF CONCEPTION, VIA NATURAL OR ARTIFICIAL OPENING: ICD-10-PCS | Performed by: OBSTETRICS & GYNECOLOGY

## 2024-08-06 PROCEDURE — 86900 BLOOD TYPING SEROLOGIC ABO: CPT | Performed by: OBSTETRICS & GYNECOLOGY

## 2024-08-06 PROCEDURE — 3E033VJ INTRODUCTION OF OTHER HORMONE INTO PERIPHERAL VEIN, PERCUTANEOUS APPROACH: ICD-10-PCS | Performed by: OBSTETRICS & GYNECOLOGY

## 2024-08-06 RX ORDER — SODIUM CHLORIDE, SODIUM LACTATE, POTASSIUM CHLORIDE, CALCIUM CHLORIDE 600; 310; 30; 20 MG/100ML; MG/100ML; MG/100ML; MG/100ML
INJECTION, SOLUTION INTRAVENOUS CONTINUOUS
Status: DISCONTINUED | OUTPATIENT
Start: 2024-08-06 | End: 2024-08-07 | Stop reason: HOSPADM

## 2024-08-06 RX ORDER — BUPIVACAINE HCL/0.9 % NACL/PF 0.25 %
5 PLASTIC BAG, INJECTION (ML) EPIDURAL AS NEEDED
Status: DISCONTINUED | OUTPATIENT
Start: 2024-08-06 | End: 2024-08-07

## 2024-08-06 RX ORDER — CITRIC ACID/SODIUM CITRATE 334-500MG
30 SOLUTION, ORAL ORAL AS NEEDED
Status: DISCONTINUED | OUTPATIENT
Start: 2024-08-06 | End: 2024-08-07 | Stop reason: HOSPADM

## 2024-08-06 RX ORDER — TERBUTALINE SULFATE 1 MG/ML
0.25 INJECTION, SOLUTION SUBCUTANEOUS AS NEEDED
Status: DISCONTINUED | OUTPATIENT
Start: 2024-08-06 | End: 2024-08-07 | Stop reason: HOSPADM

## 2024-08-06 RX ORDER — LIDOCAINE HYDROCHLORIDE AND EPINEPHRINE 15; 5 MG/ML; UG/ML
INJECTION, SOLUTION EPIDURAL AS NEEDED
Status: DISCONTINUED | OUTPATIENT
Start: 2024-08-06 | End: 2024-08-07 | Stop reason: SURG

## 2024-08-06 RX ORDER — ONDANSETRON 2 MG/ML
4 INJECTION INTRAMUSCULAR; INTRAVENOUS EVERY 6 HOURS PRN
Status: DISCONTINUED | OUTPATIENT
Start: 2024-08-06 | End: 2024-08-07 | Stop reason: HOSPADM

## 2024-08-06 RX ORDER — BUPIVACAINE HYDROCHLORIDE 2.5 MG/ML
INJECTION, SOLUTION EPIDURAL; INFILTRATION; INTRACAUDAL AS NEEDED
Status: DISCONTINUED | OUTPATIENT
Start: 2024-08-06 | End: 2024-08-07 | Stop reason: SURG

## 2024-08-06 RX ORDER — IBUPROFEN 600 MG/1
600 TABLET ORAL ONCE AS NEEDED
Status: DISCONTINUED | OUTPATIENT
Start: 2024-08-06 | End: 2024-08-07 | Stop reason: HOSPADM

## 2024-08-06 RX ORDER — DEXTROSE, SODIUM CHLORIDE, SODIUM LACTATE, POTASSIUM CHLORIDE, AND CALCIUM CHLORIDE 5; .6; .31; .03; .02 G/100ML; G/100ML; G/100ML; G/100ML; G/100ML
INJECTION, SOLUTION INTRAVENOUS AS NEEDED
Status: DISCONTINUED | OUTPATIENT
Start: 2024-08-06 | End: 2024-08-07 | Stop reason: HOSPADM

## 2024-08-06 RX ORDER — NALBUPHINE HYDROCHLORIDE 10 MG/ML
2.5 INJECTION, SOLUTION INTRAMUSCULAR; INTRAVENOUS; SUBCUTANEOUS
Status: DISCONTINUED | OUTPATIENT
Start: 2024-08-06 | End: 2024-08-07

## 2024-08-06 RX ORDER — ACETAMINOPHEN 500 MG
500 TABLET ORAL EVERY 6 HOURS PRN
Status: DISCONTINUED | OUTPATIENT
Start: 2024-08-06 | End: 2024-08-07

## 2024-08-06 RX ORDER — ACETAMINOPHEN 500 MG
1000 TABLET ORAL EVERY 6 HOURS PRN
Status: DISCONTINUED | OUTPATIENT
Start: 2024-08-06 | End: 2024-08-07

## 2024-08-06 RX ADMIN — LIDOCAINE HYDROCHLORIDE AND EPINEPHRINE 3 ML: 15; 5 INJECTION, SOLUTION EPIDURAL at 23:16:00

## 2024-08-06 RX ADMIN — BUPIVACAINE HYDROCHLORIDE 3 ML: 2.5 INJECTION, SOLUTION EPIDURAL; INFILTRATION; INTRACAUDAL at 23:11:00

## 2024-08-06 RX ADMIN — BUPIVACAINE HYDROCHLORIDE 3 ML: 2.5 INJECTION, SOLUTION EPIDURAL; INFILTRATION; INTRACAUDAL at 23:10:00

## 2024-08-06 NOTE — H&P
[facility]  History & Physical    SUBJECTIVE:    Audrey Clemens is a 34 year old  at 39w0d who presents from office following decel on NST.  EFW 2070 g ( 4 lb 9 oz); 46% at 32+ wks.  Obstetric History:    OB History    Para Term  AB Living   3 2 2     2   SAB IAB Ectopic Multiple Live Births         0 2      # Outcome Date GA Lbr Levi/2nd Weight Sex Type Anes PTL Lv   3 Current            2 Term 10/24/20 40w0d 06:39 / 00:19 7 lb 5.5 oz (3.33 kg) F NORMAL SPONT EPI N WILLIAM   1 Term 17 40w2d 11:24 / 01:40 7 lb 7.8 oz (3.395 kg) F NORMAL SPONT EPI N WILLIAM      Complications: Group B beta strep +     Past Medical History:   Past Medical History:    Blood disorder    beta thalassemia    HEADACHES    controlled on OCPs    IBS    nausea/vomiting    Mental disorder    anxiety, depression. no meds since     Screening-pulmonary TB    negative PPD      Past Surgical History:    Past Surgical History:   Procedure Laterality Date    Other surgical history      oral surgery    Other surgical history      nasal surgery    Upper gi endoscopy - referral  2009    by Dr Ordaz- neg     Family History:    Family History   Problem Relation Age of Onset    Other (Other) Sister         depression    Other (Other) Father         dx thyroid d/o age 57 ?hyperactive is on po meds    Cancer Maternal Grandmother         breast CA dx approx age 60    Heart Disorder Maternal Grandfather         CABG age approx 75    Diabetes Other         great uncle    Other (Other) Other      Social History:    Social History     Tobacco Use    Smoking status: Former    Smokeless tobacco: Never   Substance Use Topics    Alcohol use: No     Alcohol/week: 0.0 standard drinks of alcohol     Home Meds:     No current facility-administered medications on file prior to encounter.     Current Outpatient Medications on File Prior to Encounter   Medication Sig Dispense Refill    prenatal multivitamin plus DHA 27-0.8-228 MG Oral Cap Take 1  capsule by mouth daily.       Allergies:   No Known Allergies      OBJECTIVE:    Pulse: 88  BP: 131/78    abd gravid, NT  FHTs baseline 120s, moderate variability, accels appreciated, no decels  tocos Occ contraction   SVE 2 cm/80%/-1 sta, cephalic in office today    Lab Review:     GBS pos    ASSESSMENT:  39w0d  FHR deceleration on NST  GBS pos  Beta thalassemia trait  Carrier of Usher syndrome  AMA    PLAN:  Admit  Recommend delivery  Plan pitocin  Antibiotics for GBS pos

## 2024-08-06 NOTE — PLAN OF CARE
Problem: BIRTH - VAGINAL/ SECTION  Goal: Fetal and maternal status remain reassuring during the birth process  Description: INTERVENTIONS:  - Monitor vital signs  - Monitor fetal heart rate  - Monitor uterine activity  - Monitor labor progression (vaginal delivery)  - DVT prophylaxis (C/S delivery)  - Surgical antibiotic prophylaxis (C/S delivery)  Outcome: Progressing     Problem: PAIN - ADULT  Goal: Verbalizes/displays adequate comfort level or patient's stated pain goal  Description: INTERVENTIONS:  - Encourage pt to monitor pain and request assistance  - Assess pain using appropriate pain scale  - Administer analgesics based on type and severity of pain and evaluate response  - Implement non-pharmacological measures as appropriate and evaluate response  - Consider cultural and social influences on pain and pain management  - Manage/alleviate anxiety  - Utilize distraction and/or relaxation techniques  - Monitor for opioid side effects  - Notify MD/LIP if interventions unsuccessful or patient reports new pain  - Anticipate increased pain with activity and pre-medicate as appropriate  Outcome: Progressing     Problem: ANXIETY  Goal: Will report anxiety at manageable levels  Description: INTERVENTIONS:  - Administer medication as ordered  - Teach and rehearse alternative coping skills  - Provide emotional support with 1:1 interaction with staff  Outcome: Progressing     Problem: Patient/Family Goals  Goal: Patient/Family Long Term Goal  Description: Patient's Long Term Goal:     Interventions:  -   - See additional Care Plan goals for specific interventions  Outcome: Progressing  Note: Uncomplicated vaginal delivery    Interventions:  VS per protocol  I&O  Ice chips and sips as tolerated  EFM per protocol  Maintain IV as ordered  Antibiotics as needed per protocol  Informed consent  Goal: Patient/Family Short Term Goal  Description: Patient's Short Term Goal:     Interventions:   -   - See additional Care  Plan goals for specific interventions  Outcome: Progressing  Note: Adequate pain control with delivery of infant  Interventions:  Pain assessment scores as ordered  Patient scores pain a \"3\" or less  Multidisciplinary care   Nonpharmacologic comfort measures

## 2024-08-06 NOTE — PROGRESS NOTES
Pt is a 34 year old female admitted to 105/-A.     Chief Complaint   Patient presents with    Non-stress Test     Sent from OB office for ? Lates in the NST       Pt is  39w0d intra-uterine pregnancy.  History obtained, consents signed. Oriented to room, staff, and plan of care.  Pt seen by  in triage and orders received for induction with pitocin and antibiotics for GBS +.

## 2024-08-07 LAB
BASOPHILS # BLD AUTO: 0.03 X10(3) UL (ref 0–0.2)
BASOPHILS NFR BLD AUTO: 0.2 %
EOSINOPHIL # BLD AUTO: 0.11 X10(3) UL (ref 0–0.7)
EOSINOPHIL NFR BLD AUTO: 0.8 %
ERYTHROCYTE [DISTWIDTH] IN BLOOD BY AUTOMATED COUNT: 16.6 %
HCT VFR BLD AUTO: 25.3 %
HGB BLD-MCNC: 8.2 G/DL
IMM GRANULOCYTES # BLD AUTO: 0.08 X10(3) UL (ref 0–1)
IMM GRANULOCYTES NFR BLD: 0.6 %
LYMPHOCYTES # BLD AUTO: 1.84 X10(3) UL (ref 1–4)
LYMPHOCYTES NFR BLD AUTO: 13.2 %
MCH RBC QN AUTO: 21.1 PG (ref 26–34)
MCHC RBC AUTO-ENTMCNC: 32.4 G/DL (ref 31–37)
MCV RBC AUTO: 65.2 FL
MONOCYTES # BLD AUTO: 0.76 X10(3) UL (ref 0.1–1)
MONOCYTES NFR BLD AUTO: 5.5 %
NEUTROPHILS # BLD AUTO: 11.1 X10 (3) UL (ref 1.5–7.7)
NEUTROPHILS # BLD AUTO: 11.1 X10(3) UL (ref 1.5–7.7)
NEUTROPHILS NFR BLD AUTO: 79.7 %
PLATELET # BLD AUTO: 205 10(3)UL (ref 150–450)
PLATELETS.RETICULATED NFR BLD AUTO: 4.4 % (ref 0–7)
RBC # BLD AUTO: 3.88 X10(6)UL
WBC # BLD AUTO: 13.9 X10(3) UL (ref 4–11)

## 2024-08-07 PROCEDURE — 85025 COMPLETE CBC W/AUTO DIFF WBC: CPT | Performed by: OBSTETRICS & GYNECOLOGY

## 2024-08-07 PROCEDURE — 0KQM0ZZ REPAIR PERINEUM MUSCLE, OPEN APPROACH: ICD-10-PCS | Performed by: OBSTETRICS & GYNECOLOGY

## 2024-08-07 RX ORDER — ACETAMINOPHEN 500 MG
1000 TABLET ORAL EVERY 6 HOURS PRN
Status: DISCONTINUED | OUTPATIENT
Start: 2024-08-07 | End: 2024-08-08

## 2024-08-07 RX ORDER — TRANEXAMIC ACID 10 MG/ML
INJECTION, SOLUTION INTRAVENOUS
Status: DISPENSED
Start: 2024-08-07 | End: 2024-08-07

## 2024-08-07 RX ORDER — ACETAMINOPHEN 500 MG
500 TABLET ORAL EVERY 6 HOURS PRN
Status: DISCONTINUED | OUTPATIENT
Start: 2024-08-07 | End: 2024-08-08

## 2024-08-07 RX ORDER — SIMETHICONE 80 MG
80 TABLET,CHEWABLE ORAL 3 TIMES DAILY PRN
Status: DISCONTINUED | OUTPATIENT
Start: 2024-08-07 | End: 2024-08-08

## 2024-08-07 RX ORDER — TRANEXAMIC ACID 10 MG/ML
1000 INJECTION, SOLUTION INTRAVENOUS ONCE
Status: COMPLETED | OUTPATIENT
Start: 2024-08-07 | End: 2024-08-07

## 2024-08-07 RX ORDER — DOCUSATE SODIUM 100 MG/1
100 CAPSULE, LIQUID FILLED ORAL
Status: DISCONTINUED | OUTPATIENT
Start: 2024-08-07 | End: 2024-08-08

## 2024-08-07 RX ORDER — IBUPROFEN 600 MG/1
600 TABLET ORAL EVERY 6 HOURS
Status: DISCONTINUED | OUTPATIENT
Start: 2024-08-07 | End: 2024-08-08

## 2024-08-07 RX ORDER — BISACODYL 10 MG
10 SUPPOSITORY, RECTAL RECTAL ONCE AS NEEDED
Status: DISCONTINUED | OUTPATIENT
Start: 2024-08-07 | End: 2024-08-08

## 2024-08-07 NOTE — L&D DELIVERY NOTE
Jayleen, Girl [[id number]]      Labor Events     labor?: No   steroids?: None  Antibiotics received during labor?: Yes  Antibiotics (enter # doses in comment): ampicillin (Comment: x3 doses)  Rupture date/time: 2024     Rupture type: AROM  Fluid color: Clear  Labor type: Induced Onset of Labor  Induction: Oxytocin       Labor Event Times    Dilation complete date/time: 2024  Start pushing date/time: 2024       [facility] Presentation    Presentation: Vertex  Position: Left Occiput Anterior       Operative Delivery    Operative Vaginal Delivery: No                Shoulder Dystocia    Shoulder Dystocia: No       Anesthesia    Method: Epidural               Delivery      Head delivery date/time: 2024 02:09:40   Delivery date/time:  24 02:09:40   Delivery type: Normal spontaneous vaginal delivery    Details:     Delivery location: delivery room       Delivery Providers    Delivering Clinician: Donna Moncada MD   Delivery personnel:  Provider Role   Maricruz Jensen RN Baby Nurse   Alexandra Han RN Delivery Nurse             Cord    Vessels: 3 Vessels  Complications: None  Timed cord clamping: Yes  Time in sec: 30  Cord blood disposition: cord blood bank  Gases sent?: No        Measurements      Weight: 3360 g 7 lb 6.5 oz Length: 47 cm     Head circum.: 34.5 cm Chest circum.: 32.5 cm      Abdominal circum.: 29.5 cm           Placenta    Date/time: 2024  Removal: Spontaneous  Appearance: Intact  Disposition: held for future pathology       Apgars    Living status: Living   Apgar Scoring Key:    0 1 2    Skin color Blue or pale Acrocyanotic Completely pink    Heart rate Absent <100 bpm >100 bpm    Reflex irritability No response Grimace Cry or active withdrawal    Muscle tone Limp Some flexion Active motion    Respiratory effort Absent Weak cry; hypoventilation Good, crying              1 Minute:  5 Minute:  10 Minute:  15 Minute:  20 Minute:       Skin color:        Heart rate:        Reflex irritablity:        Muscle tone:        Respiratory effort:        Total:                Skin to Skin    No data filed       Vaginal Count    Initial count RN: Alexandra Han RN  Initial count Tech: Laurence Granger    Initial counts 11   0    Final counts        Final count RN: Alexandra Han RN  Final count MD: Donna Moncada MD       Lacerations    Episiotomy: None  Perineal lacerations: 2nd Repaired?: Yes     Vaginal laceration?: No      Cervical laceration?: No    Clitoral laceration?: No                                                                          Vaginal Delivery Note          Audrey Clemens Patient Status:  Inpatient    1990 MRN [id number]   [facility] LABOR & DELIVERY Attending Babita Sanches MD   Hosp Day # 1 PCP Ascencion Falcon MD       Delivery: Normal spontaneous vaginal delivery  Surgeon: Donna Moncada MD  Anesthesia: Epidural  QBL: pending  Infant: female  Apgars: 9/9  Weight: 3360g    Brief history and labor course:  Ms. Audrey Clemens is a 34 year old  at 39wk1d. She presented to labor and delivery for induction of labor due to spontaneous deceleration on NST. Her pregnancy was complicated by AMA. On exam in triage she was noted to be 2/80/-1. She was admitted for induction with pitocin and AROM.      After pushing for 6 minutes, at 0209 patient delivered a viable female , wt pending, apgars of 9 (1 min) and 9 (5 min). The fetal vertex delivered spontaneously. Baby was checked for nuchal. No nuchal cord was palpated. The anterior shoulder delivered atraumatically with maternal expulsive forces and the assistance of gentle downward traction. The posterior shoulder delivered with maternal expulsive forces and the assistance of upward traction. The remainder of the fetus delivered spontaneously.     Upon delivery, the infant was placed on the mothers abdomen and the cord was clamped and  cut. Delayed cord clamping was performed.   The infant was noted to cry spontaneously and was moving all extremities appropriately. There was no evidence for injury. Awaiting nurse resuscitators evaluated the . In the immediate post-partum, 30 units of IV pitocin was administered, and the uterus was noted to contract down well with massage and pitocin. The placenta delivered spontaneously at 0214 and was noted to have a centrally inserted 3 vessel cord.     The vagina, cervix, perineum, and rectum were inspected and there was noted to be a 2nd degree laceration. This was repaired with 3-0 vicryl in the standard fashion. Good hemostasis was noted.     At the conclusion of the procedure, all needle, sponge, and instrument counts were noted to be correct. Patient tolerated the procedure well and was allowed to recover in labor and delivery room with family and  before being transferred to the post-partum floor.     Complications:  None    Donna Moncada MD

## 2024-08-07 NOTE — PROGRESS NOTES
Patient admitted via wheelchair.  Bed in low position and locked. Side rails up x2.  IV saline locked.  ID bands checked with transfer RN.  Call light demonstrated and given to Pt.  Oriented to room and plan of care.  Pt. Instructed to call for staff assist to go to the bathroom. Pt. Verbalizes understanding.

## 2024-08-07 NOTE — ANESTHESIA PROCEDURE NOTES
Labor Analgesia    Date/Time: 8/6/2024 11:08 PM    Performed by: Baltazar Gunter MD  Authorized by: Baltazar Gunter MD      General Information and Staff    Start Time:  8/6/2024 11:08 PM  End Time:  8/6/2024 11:16 PM  Anesthesiologist:  Baltazar Gunter MD  Performed by:  Anesthesiologist  Patient Location:  OB  Site Identification: surface landmarks    Reason for Block: labor epidural    Preanesthetic Checklist: patient identified, IV checked, risks and benefits discussed, monitors and equipment checked, pre-op evaluation, timeout performed, IV bolus, anesthesia consent and sterile technique used      Procedure Details    Patient Position:  Sitting  Prep: ChloraPrep    Monitoring:  Heart rate and continuous pulse ox  Approach:  Midline    Epidural Needle    Injection Technique:  SHANIQUE air  Needle Type:  Tuohy  Needle Gauge:  17 G  Needle Length:  3.375 in  Needle Insertion Depth:  5  Location:  L3-4    Spinal Needle      Catheter    Catheter Type:  End hole  Catheter Size:  19 G  Catheter at Skin Depth:  10  Test Dose:  Negative    Assessment      Additional Comments          Medications:  Bupivacaine 0.25% 6ccs local infiltration to skin for placement.  Lidocaine 1% from prepackaged kit, administered epidural through tuohy 5ccs  Test dose from prepackaged kit Lidocaine 1.5% epinephrine 1:200,000  3ccs   Fentanyl 100micrograms (two ccs of 50mcgm/cc) given epidural through catheter.    Infusion then initiated per MAR standard solution infusion 12 ccs/hr, fentanyl 2mcgm/cc with bupivacaine 0.125%.

## 2024-08-07 NOTE — PLAN OF CARE
Problem: BIRTH - VAGINAL/ SECTION  Goal: Fetal and maternal status remain reassuring during the birth process  Description: INTERVENTIONS:  - Monitor vital signs  - Monitor fetal heart rate  - Monitor uterine activity  - Monitor labor progression (vaginal delivery)  - DVT prophylaxis (C/S delivery)  - Surgical antibiotic prophylaxis (C/S delivery)  Outcome: Progressing     Problem: PAIN - ADULT  Goal: Verbalizes/displays adequate comfort level or patient's stated pain goal  Description: INTERVENTIONS:  - Encourage pt to monitor pain and request assistance  - Assess pain using appropriate pain scale  - Administer analgesics based on type and severity of pain and evaluate response  - Implement non-pharmacological measures as appropriate and evaluate response  - Consider cultural and social influences on pain and pain management  - Manage/alleviate anxiety  - Utilize distraction and/or relaxation techniques  - Monitor for opioid side effects  - Notify MD/LIP if interventions unsuccessful or patient reports new pain  - Anticipate increased pain with activity and pre-medicate as appropriate  Outcome: Progressing     Problem: ANXIETY  Goal: Will report anxiety at manageable levels  Description: INTERVENTIONS:  - Administer medication as ordered  - Teach and rehearse alternative coping skills  - Provide emotional support with 1:1 interaction with staff  Outcome: Progressing     Problem: Patient/Family Goals  Goal: Patient/Family Long Term Goal  Description: Patient's Long Term Goal: Uncomplicated vaginal delivery    Interventions:  VS per protocol  I&O  Ice chips and sips as tolerated  EFM per protocol  Maintain IV as ordered  Antibiotics as needed per protocol  Informed consent      Interventions:    - See additional Care Plan goals for specific interventions  Outcome: Progressing  Goal: Patient/Family Short Term Goal  Description: Patient's Short Term Goal: Adequate pain control with delivery of  infant  Interventions:  Pain assessment scores as ordered  Patient scores pain a \"3\" or less  Multidisciplinary care   Nonpharmacologic comfort measures        Interventions:     - See additional Care Plan goals for specific interventions  Outcome: Progressing

## 2024-08-07 NOTE — PROGRESS NOTES
CTSP for recent vaginal bleeding  190 g on last pad change which was 50 min prior to call  Pt reports no cramps or feeling of heavy lochia  Exam reveals firm fundus and 50 min old pad w/small lochia, appropriate  TXA previously ordered and running  Will follow

## 2024-08-07 NOTE — PROGRESS NOTES
Patient up to bathroom with assist x 2.  Voided 150mL Patient transferred to mother/baby room 1104 per wheelchair in stable condition with baby and personal belongings.  Accompanied by significant other and staff.  Report given to mother/baby RN.

## 2024-08-07 NOTE — PROGRESS NOTES
SVE: 3/70/-2  AROM, minimal clear fluid  Continue pitocin titration  Epidural at patient request        Donna Moncada MD

## 2024-08-07 NOTE — ANESTHESIA PREPROCEDURE EVALUATION
PRE-OP EVALUATION    Patient Name: Audrey Clemens    Admit Diagnosis: Pregnancy (HCC) [Z34.90]    Pre-op Diagnosis: * No surgery found *    Term pregnancy in painful labor with requested analgesia.      Anesthesia Procedure: LABOR ANALGESIA    * Surgery not found *    Pre-op vitals reviewed.  Temp: 97.5 °F (36.4 °C)  Pulse: 80  Resp: 16  BP: 102/61     Body mass index is 28.41 kg/m².    Current medications reviewed.  Hospital Medications:   lactated ringers infusion   Intravenous Continuous    dextrose in lactated ringers 5% infusion   Intravenous PRN    lactated ringers IV bolus 500 mL  500 mL Intravenous PRN    acetaminophen (Tylenol Extra Strength) tab 500 mg  500 mg Oral Q6H PRN    acetaminophen (Tylenol Extra Strength) tab 1,000 mg  1,000 mg Oral Q6H PRN    ibuprofen (Motrin) tab 600 mg  600 mg Oral Once PRN    ondansetron (Zofran) 4 MG/2ML injection 4 mg  4 mg Intravenous Q6H PRN    oxyTOCIN in sodium chloride 0.9% (Pitocin) 30 Units/500mL infusion premix  62.5-900 alva-units/min Intravenous Continuous    terbutaline (Brethine) 1 MG/ML injection 0.25 mg  0.25 mg Subcutaneous PRN    sodium citrate-citric acid (Bicitra) 500-334 MG/5ML oral solution 30 mL  30 mL Oral PRN    [COMPLETED] ampicillin (Omnipen) 2 g in sodium chloride 0.9% 100 mL IVPB-MBP  2 g Intravenous Once    Followed by    ampicillin (Omnipen) 1 g in sodium chloride 0.9% 100 mL IVPB-MBP  1 g Intravenous Q4H    oxyTOCIN in sodium chloride 0.9% (Pitocin) 30 Units/500mL infusion premix  0.5-20 alva-units/min Intravenous Continuous    lactated ringers IV bolus 1,000 mL  1,000 mL Intravenous Once    fentaNYL-bupivacaine 2 mcg/mL-0.125% in sodium chloride 0.9% 100 mL EPIDURAL infusion premix  12 mL/hr Epidural Continuous    fentaNYL (Sublimaze) 50 mcg/mL injection 100 mcg  100 mcg Epidural Once    EPHEDrine (PF) 25 MG/5 ML injection 5 mg  5 mg Intravenous PRN    nalbuphine (Nubain) 10 mg/mL injection 2.5 mg  2.5 mg Intravenous Q15 Min PRN    oxyTOCIN  in sodium chloride 0.9% (Pitocin) 30 Units/500mL infusion premix  0.5-20 alva-units/min Intravenous Continuous       Outpatient Medications:     Medications Prior to Admission   Medication Sig Dispense Refill Last Dose    prenatal multivitamin plus DHA 27-0.8-228 MG Oral Cap Take 1 capsule by mouth daily.   8/5/2024       Allergies: Patient has no known allergies.      Anesthesia Evaluation    Patient summary reviewed.    Anesthetic Complications  (-) history of anesthetic complications         GI/Hepatic/Renal                                 Cardiovascular    Negative cardiovascular ROS.    Exercise tolerance: good     MET: >4                                           Endo/Other    Negative endo/other ROS.                              Pulmonary    Negative pulmonary ROS.                       Neuro/Psych    Negative neuro/psych ROS.                          PER Epic reviewed Patient Active Problem List:     Major depressive disorder, recurrent episode, moderate (HCC)     Beta thalassemia trait     Supervision of other normal pregnancy, antepartum (HCC)     Pregnancy (HCC)     Vaginal delivery (HCC)          Past Surgical History:   Procedure Laterality Date    Other surgical history  2007    oral surgery    Other surgical history      nasal surgery    Upper gi endoscopy - referral  2009    by Dr Ordaz- neg     Social History     Socioeconomic History    Marital status:     Number of children: 0   Occupational History    Occupation: [employer]     Comment: psych   Tobacco Use    Smoking status: Former    Smokeless tobacco: Never   Vaping Use    Vaping status: Never Used   Substance and Sexual Activity    Alcohol use: No     Alcohol/week: 0.0 standard drinks of alcohol    Drug use: No    Sexual activity: Not Currently     Partners: Male     History   Drug Use No     Available pre-op labs reviewed.  Lab Results   Component Value Date    WBC 8.5 08/06/2024    RBC 4.58 08/06/2024    HGB 9.8 (L) 08/06/2024     HCT 30.7 (L) 08/06/2024    MCV 67.0 (L) 08/06/2024    MCH 21.4 (L) 08/06/2024    MCHC 31.9 08/06/2024    RDW 17.9 08/06/2024    .0 08/06/2024               Airway      Mallampati: II  Mouth opening: >3 FB  TM distance: > 6 cm  Neck ROM: full Cardiovascular    Cardiovascular exam normal.  Rhythm: regular       Dental  Comment: Patient demonstrates acceptable dentition.  No teeth are grossly loose on inspection.           Pulmonary  Comment: Patient with unlabored respirations outside of contractions.  Pulmonary exam normal.                 Other findings              ASA: 2   Plan: epidural  NPO status verified and patient meets guidelines.        Comment: Continuous labor epidural analgesia is planned.   Patient and family are aware of risks post dural puncture headache, unilateral block, and potential failure of pain relief.  Implied distant risks include spinal hematoma and infection.  Understanding of risks is demonstrated, and wishes proceed with labor analgesia.     Ed at bedside.  Plan/risks discussed with: patient and spouse      Consented to blood products.          Present on Admission:  **None**

## 2024-08-07 NOTE — PLAN OF CARE
Problem: BIRTH - VAGINAL/ SECTION  Goal: Fetal and maternal status remain reassuring during the birth process  Description: INTERVENTIONS:  - Monitor vital signs  - Monitor fetal heart rate  - Monitor uterine activity  - Monitor labor progression (vaginal delivery)  - DVT prophylaxis (C/S delivery)  - Surgical antibiotic prophylaxis (C/S delivery)  2024 by Alexandra Han RN  Outcome: Completed  2024 by Alexandra Han RN  Outcome: Progressing     Problem: PAIN - ADULT  Goal: Verbalizes/displays adequate comfort level or patient's stated pain goal  Description: INTERVENTIONS:  - Encourage pt to monitor pain and request assistance  - Assess pain using appropriate pain scale  - Administer analgesics based on type and severity of pain and evaluate response  - Implement non-pharmacological measures as appropriate and evaluate response  - Consider cultural and social influences on pain and pain management  - Manage/alleviate anxiety  - Utilize distraction and/or relaxation techniques  - Monitor for opioid side effects  - Notify MD/LIP if interventions unsuccessful or patient reports new pain  - Anticipate increased pain with activity and pre-medicate as appropriate  2024 by Alexandra Han RN  Outcome: Completed  2024 by Alexandra Han RN  Outcome: Progressing     Problem: ANXIETY  Goal: Will report anxiety at manageable levels  Description: INTERVENTIONS:  - Administer medication as ordered  - Teach and rehearse alternative coping skills  - Provide emotional support with 1:1 interaction with staff  2024 by Alexandra Han RN  Outcome: Completed  2024 by Alexandra Han RN  Outcome: Progressing     Problem: Patient/Family Goals  Goal: Patient/Family Long Term Goal  Description: Patient's Long Term Goal: Uncomplicated vaginal delivery    Interventions:  VS per protocol  I&O  Ice chips and sips as tolerated  EFM per protocol  Maintain IV as ordered  Antibiotics as  needed per protocol  Informed consent      Interventions:    - See additional Care Plan goals for specific interventions  8/7/2024 0250 by Alexandra Han, RN  Outcome: Completed  8/7/2024 0249 by Alexandra Han, RN  Outcome: Progressing  Goal: Patient/Family Short Term Goal  Description: Patient's Short Term Goal: Adequate pain control with delivery of infant  Interventions:  Pain assessment scores as ordered  Patient scores pain a \"3\" or less  Multidisciplinary care   Nonpharmacologic comfort measures        Interventions:     - See additional Care Plan goals for specific interventions  8/7/2024 0250 by Alexandra Han, RN  Outcome: Completed  8/7/2024 0249 by Alexandra Han, RN  Outcome: Progressing

## 2024-08-07 NOTE — PROGRESS NOTES
08/07/24 0700   Provider Notification   Reason for Communication Review case  (Pt bleeding-pad weighed 170 grams in 1 1/2 hours, small clots)   Provider Name   (Dr. Moncada)   Method of Communication Call   Response See orders  (Plan restart IV and infuse TXA; stat CBC with differential)   Notification Time 0700

## 2024-08-08 VITALS
HEIGHT: 66 IN | WEIGHT: 176 LBS | OXYGEN SATURATION: 100 % | TEMPERATURE: 98 F | HEART RATE: 73 BPM | DIASTOLIC BLOOD PRESSURE: 80 MMHG | BODY MASS INDEX: 28.28 KG/M2 | RESPIRATION RATE: 16 BRPM | SYSTOLIC BLOOD PRESSURE: 122 MMHG

## 2024-08-08 LAB
ERYTHROCYTE [DISTWIDTH] IN BLOOD BY AUTOMATED COUNT: 16.9 %
HCT VFR BLD AUTO: 25.6 %
HGB BLD-MCNC: 8 G/DL
MCH RBC QN AUTO: 21 PG (ref 26–34)
MCHC RBC AUTO-ENTMCNC: 31.3 G/DL (ref 31–37)
MCV RBC AUTO: 67.2 FL
PLATELET # BLD AUTO: 191 10(3)UL (ref 150–450)
PLATELETS.RETICULATED NFR BLD AUTO: 4.9 % (ref 0–7)
RBC # BLD AUTO: 3.81 X10(6)UL
WBC # BLD AUTO: 9.5 X10(3) UL (ref 4–11)

## 2024-08-08 PROCEDURE — 85027 COMPLETE CBC AUTOMATED: CPT | Performed by: OBSTETRICS & GYNECOLOGY

## 2024-08-08 NOTE — PROGRESS NOTES
Postpartum Progress Note    SUBJECTIVE:  Postpartum day #1 s/p     No overnight events.  Patient doing well without complaints.  Ambulating, tolerating PO, voiding, pain well controlled.      OBJECTIVE:    Vital signs in last 24 hours:  Temp:  [97.6 °F (36.4 °C)-98 °F (36.7 °C)] 97.6 °F (36.4 °C)  Pulse:  [73-84] 73  Resp:  [16] 16  BP: (115-122)/(74-80) 122/80  Gen: appears well, nad  Abdomin: nontender, fundus firm below umbilicus  Lochia:    Minimal to moderate  Extremity: nontender    ASSESSMENT/PLAN:  Postpartum Day #1 s/p   Recovering well  Continue present management  Beta thal anemia, stable  Discharge home today - instructions reviewed, follow up in 6 weeks for postpartum visit    Donna Moncada MD  2024  10:28 AM

## 2024-08-08 NOTE — PROGRESS NOTES
Labor Analgesia Follow Up Note    Patient underwent epidural anesthesia for labor analgesia,    Placenta Date/Time: 8/7/2024  2:14 AM     Delivery Date/Time:: 8/7/2024   2:09 AM     /74 (BP Location: Left arm)   Pulse 84   Temp 98 °F (36.7 °C) (Oral)   Resp 16   Ht 1.676 m (5' 6\")   Wt 79.8 kg (176 lb)   SpO2 100%   Breastfeeding Yes   BMI 28.41 kg/m²     Assessment:  Patient seen and no apparent anesthesia related complications.    Thank you for asking us to participate in the care of your patient.    Nav Vicente MD  [facility]

## 2024-08-08 NOTE — DISCHARGE INSTRUCTIONS
For PAIN:    Ibuprofen 600 mg every 6 hours  (6:00) (12:00)    Or    Tylenol Extra Strength 1-2 tabs every 6 hours    For CONSTIPATION:    Colace Twice a day

## 2024-08-12 ENCOUNTER — TELEPHONE (OUTPATIENT)
Dept: OBGYN UNIT | Facility: HOSPITAL | Age: 34
End: 2024-08-12

## 2024-09-27 ENCOUNTER — HOSPITAL ENCOUNTER (OUTPATIENT)
Age: 34
Discharge: HOME | End: 2024-09-27
Payer: COMMERCIAL

## 2024-09-27 VITALS
SYSTOLIC BLOOD PRESSURE: 118 MMHG | HEART RATE: 54 BPM | OXYGEN SATURATION: 97 % | DIASTOLIC BLOOD PRESSURE: 88 MMHG | TEMPERATURE: 98 F | RESPIRATION RATE: 18 BRPM

## 2024-09-27 DIAGNOSIS — B96.89 ACUTE BACTERIAL TONSILLITIS: Primary | ICD-10-CM

## 2024-09-27 DIAGNOSIS — J03.80 ACUTE BACTERIAL TONSILLITIS: Primary | ICD-10-CM

## 2024-09-27 LAB — S PYO AG THROAT QL: NEGATIVE

## 2024-09-27 PROCEDURE — 87880 STREP A ASSAY W/OPTIC: CPT | Performed by: NURSE PRACTITIONER

## 2024-09-27 PROCEDURE — 99213 OFFICE O/P EST LOW 20 MIN: CPT | Performed by: NURSE PRACTITIONER

## 2024-09-27 RX ORDER — AMOXICILLIN 500 MG/1
500 TABLET, FILM COATED ORAL 2 TIMES DAILY
Qty: 20 TABLET | Refills: 0 | Status: SHIPPED | OUTPATIENT
Start: 2024-09-27 | End: 2024-10-07

## 2024-09-27 NOTE — ED PROVIDER NOTES
Patient Seen in: [facility]      History     Chief Complaint   Patient presents with    Sore Throat     Stated Complaint: Strep Test    Subjective:   HPI    34-year-old female presents with strep exposure here for strep test. No f/c/n/v/d. No drooling/trismus/submandibular swelling.  Speech clear and intact. No muffled voice, drooling, sialorrhea.    Objective:   Past Medical History:    Blood disorder    beta thalassemia    HEADACHES    controlled on OCPs    IBS    nausea/vomiting    Mental disorder    anxiety, depression. no meds since 2014    Screening-pulmonary TB    negative PPD               Past Surgical History:   Procedure Laterality Date    Other surgical history  2007    oral surgery    Other surgical history      nasal surgery    Upper gi endoscopy - referral  2009    by Dr Ordaz- sharon                Social History     Socioeconomic History    Marital status:     Number of children: 0   Occupational History    Occupation: [employer]     Comment: psych   Tobacco Use    Smoking status: Former    Smokeless tobacco: Never   Vaping Use    Vaping status: Never Used   Substance and Sexual Activity    Alcohol use: No     Alcohol/week: 0.0 standard drinks of alcohol    Drug use: No    Sexual activity: Not Currently     Partners: Male   Social History Narrative    Working    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         Diet: well balanced, gets enough fruits/veggies    Exercise: runs    Sleep: ok, gets about 6 hrs    Stress: managable--good support        Periods: regular    Last pap 2013 normal    No heavy bleeding/cramping         Social Determinants of Health     Financial Resource Strain: Low Risk  (8/6/2024)    Financial Resource Strain     Difficulty of Paying Living Expenses: Not very hard     Med Affordability: No   Food Insecurity: No Food Insecurity (8/6/2024)    Food Insecurity     Food Insecurity: Never  true   Transportation Needs: No Transportation Needs (8/6/2024)    Transportation Needs     Lack of Transportation: No     Car Seat: Yes   Stress: No Stress Concern Present (8/6/2024)    Stress     Feeling of Stress : No   Housing Stability: Low Risk  (8/6/2024)    Housing Stability     Housing Instability: No     Crib or Bassinette: Yes              Review of Systems    Positive for stated Chief Complaint: Sore Throat    Other systems are as noted in HPI.  Constitutional and vital signs reviewed.      All other systems reviewed and negative except as noted above.    Physical Exam     ED Triage Vitals [09/27/24 0945]   /88   Pulse 54   Resp 18   Temp 98 °F (36.7 °C)   Temp src Temporal   SpO2 97 %   O2 Device None (Room air)       Current Vitals:   Vital Signs  BP: 118/88  Pulse: 54  Resp: 18  Temp: 98 °F (36.7 °C)  Temp src: Temporal    Oxygen Therapy  SpO2: 97 %  O2 Device: None (Room air)            Physical Exam  Vitals and nursing note reviewed.   Constitutional:       General: She is not in acute distress.     Appearance: She is not toxic-appearing.   HENT:      Head: Normocephalic.      Right Ear: Tympanic membrane normal.      Left Ear: Tympanic membrane normal.      Nose: Nose normal. No congestion or rhinorrhea.      Mouth/Throat:      Mouth: Mucous membranes are moist.      Pharynx: Posterior oropharyngeal erythema present. No uvula swelling.      Tonsils: Tonsillar exudate present. No tonsillar abscesses. 2+ on the right. 2+ on the left.   Pulmonary:      Effort: Pulmonary effort is normal. No respiratory distress.   Abdominal:      General: Abdomen is flat.   Musculoskeletal:         General: Normal range of motion.      Cervical back: Normal range of motion.   Skin:     General: Skin is warm and dry.      Capillary Refill: Capillary refill takes less than 2 seconds.   Neurological:      General: No focal deficit present.      Mental Status: She is alert.                 ED Course     Labs  Reviewed   POCT RAPID STREP - Normal                        MDM                                          Medical Decision Making  34 year old female p/w need for  strep testing. Strep testing is negative. Given 7 week old at home, and PE cw tonsillitis centor criteria 4 will treat.     Plan:   - home with supportive care  - tylenol, motrin prn  - f/u with PCP    Physical exam remained stable over serial reexaminations as previously documented. External chart review completed, no recent hospitalizations for the same.     I have discussed with the patient the results of tests, differential diagnosis, and warning signs and symptoms that should prompt immediate return.  The patient understands these instructions and agrees to the follow-up plan provided.  There are no barriers to learning.   Appropriate f/u given.  Patient agrees to return for any concerns/problems/complications.    Differential diagnosis reflecting the complexity of care include: Strep pharyngitis, viral pharyngitis, URI, tonsillitis    Comorbidities that add complexity to management include: N/A    External chart review was done and was noted:y    History obtained by an independent source was from:patient    Discussions of management was done with: N/A    My independent interpretation of studies of:na    Diagnostic tests and medications considered but not ordered were:na    Social determinants of health that affect care:na    Shared decision making was done by patient, self            Disposition and Plan     Clinical Impression:  1. Acute bacterial tonsillitis         Disposition:  Discharge  9/27/2024 10:09 am    Follow-up:  Ascencion Falcon MD  [address] [facility] [address]  [phone]          Ascencion Falcon MD  [address] [facility] [address]  [phone]                Medications Prescribed:  Discharge Medication List as of 9/27/2024 10:17 AM        START taking these medications    Details   amoxicillin 500 MG Oral Tab Take 1  tablet (500 mg total) by mouth 2 (two) times daily for 10 days., Normal, Disp-20 tablet, R-0

## 2024-09-27 NOTE — ED INITIAL ASSESSMENT (HPI)
and other daughter + for strept, pt would like to get tested due noticing redness in her throat.

## 2025-07-13 ENCOUNTER — HOSPITAL ENCOUNTER (OUTPATIENT)
Age: 35
Discharge: HOME | End: 2025-07-13
Payer: COMMERCIAL

## 2025-07-13 VITALS
OXYGEN SATURATION: 100 % | DIASTOLIC BLOOD PRESSURE: 72 MMHG | SYSTOLIC BLOOD PRESSURE: 117 MMHG | HEART RATE: 96 BPM | TEMPERATURE: 99 F | RESPIRATION RATE: 16 BRPM

## 2025-07-13 DIAGNOSIS — J02.0 STREP PHARYNGITIS: Primary | ICD-10-CM

## 2025-07-13 LAB — S PYO AG THROAT QL: POSITIVE

## 2025-07-13 PROCEDURE — 87880 STREP A ASSAY W/OPTIC: CPT | Performed by: PHYSICIAN ASSISTANT

## 2025-07-13 PROCEDURE — 99213 OFFICE O/P EST LOW 20 MIN: CPT | Performed by: PHYSICIAN ASSISTANT

## 2025-07-13 RX ORDER — AMOXICILLIN 500 MG/1
500 TABLET, FILM COATED ORAL 2 TIMES DAILY
Qty: 20 TABLET | Refills: 0 | Status: SHIPPED | OUTPATIENT
Start: 2025-07-13 | End: 2025-07-23

## 2025-07-13 NOTE — ED PROVIDER NOTES
Patient Seen in: [facility]        History  No chief complaint on file.    Stated Complaint: sore throat    Subjective:   HPI            35-year-old female presenting to the immediate care for evaluation of a sore throat which she has had for the last 2 days.  No associated fever/chills, runny nose or cough.  Patient denies any change in her voice.  No known sick contacts.      Objective:     Past Medical History:    Blood disorder    beta thalassemia    HEADACHES    controlled on OCPs    IBS    nausea/vomiting    Mental disorder    anxiety, depression. no meds since 2014    Screening-pulmonary TB    negative PPD               Past Surgical History:   Procedure Laterality Date    Other surgical history  2007    oral surgery    Other surgical history      nasal surgery    Upper gi endoscopy - referral  2009    by Dr Ordaz- sharon                Social History     Socioeconomic History    Marital status:     Number of children: 0   Occupational History    Occupation: [employer]     Comment: psych   Tobacco Use    Smoking status: Former    Smokeless tobacco: Never   Vaping Use    Vaping status: Never Used   Substance and Sexual Activity    Alcohol use: No     Alcohol/week: 0.0 standard drinks of alcohol    Drug use: No    Sexual activity: Not Currently     Partners: Male   Social History Narrative    Working    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         Diet: well balanced, gets enough fruits/veggies    Exercise: runs    Sleep: ok, gets about 6 hrs    Stress: managable--good support        Periods: regular    Last pap 2013 normal    No heavy bleeding/cramping         Social Drivers of Health     Food Insecurity: No Food Insecurity (8/6/2024)    Food Insecurity     Food Insecurity: Never true   Transportation Needs: No Transportation Needs (8/6/2024)    Transportation Needs     Lack of Transportation: No     Car Seat:  Yes   Stress: No Stress Concern Present (8/6/2024)    Stress     Feeling of Stress : No   Housing Stability: Low Risk  (8/6/2024)    Housing Stability     Housing Instability: No     Crib or Bassinette: Yes              Review of Systems    Positive for stated complaint: sore throat  Other systems are as noted in HPI.  Constitutional and vital signs reviewed.      All other systems reviewed and negative except as noted above.                  Physical Exam    ED Triage Vitals [07/13/25 0819]   /72   Pulse 96   Resp 16   Temp 98.9 °F (37.2 °C)   Temp src Oral   SpO2 100 %   O2 Device None (Room air)       Current Vitals:   Vital Signs  BP: 117/72  Pulse: 96  Resp: 16  Temp: 98.9 °F (37.2 °C)  Temp src: Oral    Oxygen Therapy  SpO2: 100 %  O2 Device: None (Room air)            Physical Exam  Vitals and nursing note reviewed.   Constitutional:       General: She is not in acute distress.  HENT:      Head: Normocephalic and atraumatic.      Right Ear: External ear normal.      Left Ear: External ear normal.      Nose: Nose normal.      Mouth/Throat:      Mouth: Mucous membranes are moist.   Eyes:      Extraocular Movements: Extraocular movements intact.      Pupils: Pupils are equal, round, and reactive to light.   Cardiovascular:      Rate and Rhythm: Normal rate.   Pulmonary:      Effort: Pulmonary effort is normal.   Abdominal:      General: Abdomen is flat.   Musculoskeletal:         General: Normal range of motion.      Cervical back: Normal range of motion.   Skin:     General: Skin is warm.   Neurological:      General: No focal deficit present.      Mental Status: She is alert and oriented to person, place, and time.   Psychiatric:         Mood and Affect: Mood normal.         Behavior: Behavior normal.                 ED Course  Labs Reviewed   POCT RAPID STREP - Abnormal; Notable for the following components:       Result Value    POCT Rapid Strep Positive (*)     All other components within normal  limits          35-year-old female presenting to the immediate care for evaluation of sore throat, headache.  Patient afebrile in the immediate care.  Posterior oropharynx with tonsillar erythema, tonsillar edema and exudates noted    Ddx- strep pharyngitis, viral pharyngitis, viral uri           Strep positive. Amoxicillin rx'd          MDM             Medical Decision Making      Disposition and Plan     Clinical Impression:  1. Strep pharyngitis         Disposition:  Discharge  7/13/2025  8:44 am    Follow-up:  Ascencion Falcon MD  [address] [place] [id number]  [phone]                Medications Prescribed:  Discharge Medication List as of 7/13/2025  8:57 AM        START taking these medications    Details   amoxicillin 500 MG Oral Tab Take 1 tablet (500 mg total) by mouth 2 (two) times daily for 10 days., Normal, Disp-20 tablet, R-0                   Supplementary Documentation:

## 2025-08-05 ENCOUNTER — RESULTS FOLLOW-UP (OUTPATIENT)
Dept: URGENT CARE | Age: 35
End: 2025-08-05

## 2025-08-05 ENCOUNTER — WALK IN (OUTPATIENT)
Dept: URGENT CARE | Age: 35
End: 2025-08-05

## 2025-08-05 VITALS
HEART RATE: 57 BPM | HEIGHT: 66 IN | SYSTOLIC BLOOD PRESSURE: 106 MMHG | WEIGHT: 155 LBS | RESPIRATION RATE: 18 BRPM | DIASTOLIC BLOOD PRESSURE: 70 MMHG | OXYGEN SATURATION: 99 % | TEMPERATURE: 98.2 F | BODY MASS INDEX: 24.91 KG/M2

## 2025-08-05 DIAGNOSIS — N39.0 URINARY TRACT INFECTION WITH HEMATURIA, SITE UNSPECIFIED: ICD-10-CM

## 2025-08-05 DIAGNOSIS — R31.9 URINARY TRACT INFECTION WITH HEMATURIA, SITE UNSPECIFIED: ICD-10-CM

## 2025-08-05 DIAGNOSIS — R30.0 DYSURIA: Primary | ICD-10-CM

## 2025-08-05 LAB
APPEARANCE, POC: CLEAR
BILIRUB UR QL STRIP: NEGATIVE
COLOR UR: YELLOW
GLUCOSE UR-MCNC: NEGATIVE MG/DL
HGB UR QL STRIP: ABNORMAL
KETONES UR STRIP-MCNC: NEGATIVE MG/DL
LEUKOCYTE ESTERASE UR QL STRIP: ABNORMAL
NITRITE UR QL STRIP: NEGATIVE
PH UR: 6 [PH] (ref 5–7)
PROT UR-MCNC: ABNORMAL MG/DL
SP GR UR: 1.01 (ref 1–1.03)
TEST LOT EXPIRATION DATE: ABNORMAL
TEST LOT NUMBER: ABNORMAL
UROBILINOGEN UR-MCNC: 0.2 MG/DL (ref 0–1)

## 2025-08-05 PROCEDURE — 87186 SC STD MICRODIL/AGAR DIL: CPT | Performed by: CLINICAL MEDICAL LABORATORY

## 2025-08-05 PROCEDURE — 87077 CULTURE AEROBIC IDENTIFY: CPT | Performed by: CLINICAL MEDICAL LABORATORY

## 2025-08-05 PROCEDURE — 99203 OFFICE O/P NEW LOW 30 MIN: CPT | Performed by: NURSE PRACTITIONER

## 2025-08-05 PROCEDURE — 87086 URINE CULTURE/COLONY COUNT: CPT | Performed by: CLINICAL MEDICAL LABORATORY

## 2025-08-05 PROCEDURE — 81003 URINALYSIS AUTO W/O SCOPE: CPT | Performed by: NURSE PRACTITIONER

## 2025-08-07 LAB — BACTERIA UR CULT: ABNORMAL

## (undated) NOTE — LETTER
2024      Donna Moncada MD  [address]  [facility] [address]  [facility] [address]  Via Fax: [phone]  Patient: Audrey Clemens  : 1990    Dear Colleague:  Thank you for referring your patient to me for a Maternal Fetal Medicine evaluation. Please see my attached note for my findings and recommendations.      Should you have any questions or concerns, please do not hesitate to contact me at the number listed below.    Best Regards,      Isela Aparicio MD  [facility] MATERNAL FETAL MEDICINE  [address] [facility] [address]  [phone]    cc: No Recipients      [facility] Department of Maternal Fetal Medicine  Patient Name: Audrey Clemens  Patient : 1990  Physician: Isela Aparicio MD    Outpatient Maternal-Fetal Medicine Follow-Up     Dear Dr. Moncada,     Thank you for requesting ultrasound evaluation and maternal fetal medicine consultation on your patient Audrey Clemens.  As you are aware she is a 34 year old female  with a Fernando pregnancy and an Estimated Date of Delivery: 24.  She returned to maternal-fetal medicine today for a follow-up visit.  Her history was reviewed from her prior visit and there were no interval changes.     Antepartum Risk Factors  Advanced maternal age  Previous child with Usher syndrome  Beta thalassemia trait      S: She reports good fetal movement.  She has no concerns or complaints.  She passed her GDM screen.    PHYSICAL EXAMINATION:  /66   Pulse 88   Wt 165 lb (74.8 kg)   BMI 28.31 kg/m²   General: alert and oriented, no acute distress  Abdomen: gravid, soft, non-tender  Extremities: non-tender, no edema     OBSTETRIC ULTRASOUND  The patient had a fetal growth ultrasound today which I interpreted the results and reviewed them with the patient.    Ultrasound Findings:  Single IUP in cephalic presentation.    Placenta is posterior.   A 3 vessel cord is noted.  Cardiac activity is present at 142 bpm  EFW 2070 g ( 4 lb  9 oz); 46%.    INDER is  9.4 cm.  MVP is 3.7 cm  BPP is 8/8.     The fetal measurements are consistent with established EDC. No gross ultrasound evidence of structural abnormalities are seen today. The patient understands that ultrasound cannot rule out all structural and chromosomal abnormalities.     See imaging tab for the complete US report.  DISCUSSION  During her visit we discussed and reviewed the following issues:  ADVANCED MATERNAL AGE   morbidity mortality associated with advancing maternal age was discussed previously and reviewed.  See [facility] note from 2024 for detailed review.  I reviewed with the patient that pregnancies in women of advanced maternal age (35 or older at delivery) are associated with elevated risks. Specifically, there is a higher rate of:  Fetal malformations  Preeclampsia  Gestational diabetes  Intrauterine fetal death    As a result, enhanced pregnancy surveillance is advised for these patients including a comprehensive ultrasound to assess for fetal malformations (at 20 weeks) and a third trimester ultrasound assessment for fetal growth (at 32 weeks). In addition, weekly NST's (initiating at 36 weeks gestation for women 35-39 years and at 32 weeks gestation for women 40 years and older) are also advised. Routine obstetric care is more than adequate to assess for gestational diabetes and preeclampsia; hence, no further significant alterations in obstetric care are advised.      Beta thalassemia and other hemoglobinopathies were discussed previously and reviewed.  See [facility] note from 2024 for detailed review    Both parents are carriers for Usher syndrome.  Their first daughter is affected and has cochlear implants.  She is doing quite well.    They previously declined amniocentesis for prenatal genetic diagnosis in this pregnancy.    We reviewed the signs and symptoms of preeclampsia,  labor and monitoring fetal movement.  We discussed reasons for her to call her  physician.    We discussed the recommended plan of care based on her  risk factors.  Audrey had her questions answered to her satisfaction      IMPRESSION:  IUP at 32w6d   Normal fetal growth and  testing  AMA Declines aneuploidy screening and invasive testing.  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 Moncada  Weekly NST's at 36 weeks.       Total time spent 20 minutes this calendar day which includes preparing to see the patient including chart review, obtaining and/or reviewing additional medical history, performing a physical exam and evaluation, documenting clinical information in the electronic medical record, independently interpreting results, counseling the patient, communicating results to the patient/family/caregiver and coordinating care.     Case discussed with patient who demonstrated understanding and agreement with plan.     Thank you for allowing me to participate in the care of this patient.  Please feel free to contact me with any questions.    Isela Aparicio MD  Maternal-Fetal Medicine       Note to patient and family:  The  Century Cures Act makes medical notes available to patients in the interest of transparency.  However, please be advised that this is a medical document.  It is intended as a peer to peer communication.  It is written in medical language and may contain abbreviations or verbiage that are technical and unfamiliar.  It may appear blunt or direct.  Medical documents are intended to carry relevant information, facts as evident, and the clinical opinion of the practitioner.      Pt for Growth U  Denies pregnancy complaints  States active fetus

## (undated) NOTE — LETTER
Dear new mom:    We've missed you! The nurses of [facility] have tried to reach you by phone to ask if you had any questions regarding your health or the care of your new little one.     Please feel free to call your doctor with an

## (undated) NOTE — LETTER
Dear New MomSally, we missed you! The nurses of [facility] have tried to reach you by phone to ask if you have any questions regarding your health or the health and care of your new little one.    We hope you are doing well. If, for any reason, you have questions or concerns about your health or your baby’s health, please contact your provider or your pediatrician or family medicine physician regarding your baby.     At [facility], we feel that postpartum support is very important for new families. Please see the enclosed new parent support flyer that lists support programs and resources with both in-person and online options.     Additionally, our Breastfeeding Centers at [facility] and [facility] in [place], offer outpatient visits with our International Board-Certified Lactation   Consultants (IBCLCs) for any breastfeeding concerns or questions you may have.    For issues related to stress, anxiety or depression, we have a Nurturing Mom support group that meets both in-person or online.  There’s also a 24-hour Mom’s Line where you can request a phone call from a clinical therapist for assistance for postpartum depression.    We encourage you to take advantage of these programs and resources as you recover from childbirth and learn to care for your new infant.    Best wishes,    [facility]            [phone]

## (undated) NOTE — LETTER
2024      Donna Moncada MD  [address]  [facility] [address]  Via Fax: [phone]  Patient: Audrey Clemens  : 1990    Dear Colleague:  Thank you for referring your patient to me for a Maternal Fetal Medicine evaluation. Please see my attached note for my findings and recommendations.      Should you have any questions or concerns, please do not hesitate to contact me at the number listed below.    Best Regards,      Melinda Sanches MD  [facility] MATERNAL FETAL MEDICINE  [address]  [facility] [address]  [phone]    cc: No Recipients      [facility] Department of Maternal Fetal Medicine  Patient Name: Audrey Clemens  Patient : 1990  Physician: Melinda Sanches MD    Outpatient Maternal-Fetal Medicine Consultation    Dear Dr. Moncada,    Thank you for requesting ultrasound evaluation and maternal fetal medicine consultation on your patient Audrey Clemens.  As you are aware she is a 34 year old female  with a rogers pregnancy and an Estimated Date of Delivery: 24  A maternal-fetal medicine consultation was requested secondary to Advanced Maternal Age.  Her prenatal records and labs were reviewed.    Her first daughter has Usher type Ib syndrome.  Her hearing loss was diagnosed at 2 years and was progressive.  She has cochlear implants and is doing very well.    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  It is just something that is found on her labs.  ROS    HISTORY  OB History    Para Term  AB Living   3 2 2     2   SAB IAB Ectopic Multiple Live Births         0 2      # Outcome Date GA Lbr Levi/2nd Weight Sex Delivery Anes PTL Lv   3 Current            2 Term 10/24/20 40w0d 06:39 / 00:19 7 lb 5.5 oz (3.33 kg) F NORMAL SPONT EPI N WILLIAM   1 Term 17 40w2d 11:24 / 01:40 7 lb 7.8 oz (3.395 kg) F NORMAL SPONT EPI N WILLIAM      Complications: Group B beta strep +       Allergies:  No Known Allergies   Current  Meds:  Current Outpatient Medications   Medication Sig Dispense Refill    prenatal multivitamin plus DHA 27-0.8-228 MG Oral Cap Take 1 capsule by mouth daily.          HISTORY:  Past Medical History:   Diagnosis Date    Blood disorder     beta thalassemia    HEADACHES     controlled on OCPs    IBS     nausea/vomiting    Mental disorder     anxiety, depression. no meds since 2014    Screening-pulmonary TB 9/17/2013    negative PPD       Past Surgical History:   Procedure Laterality Date    OTHER SURGICAL HISTORY  2007    oral surgery    OTHER SURGICAL HISTORY      nasal surgery    UPPER GI ENDOSCOPY - REFERRAL  2009    by Dr Ordaz- neg      Family History   Problem Relation Age of Onset    Other (Other) Sister         depression    Other (Other) Father         dx thyroid d/o age 57 ?hyperactive is on po meds    Cancer Maternal Grandmother         breast CA dx approx age 60    Heart Disorder Maternal Grandfather         CABG age approx 75    Diabetes Other         great uncle    Other (Other) Other       Social History     Socioeconomic History    Marital status:     Number of children: 0   Occupational History    Occupation: [employer]     Comment: psych   Tobacco Use    Smoking status: Former    Smokeless tobacco: Never   Vaping Use    Vaping Use: Never used   Substance and Sexual Activity    Alcohol use: No     Alcohol/week: 0.0 standard drinks of alcohol    Drug use: No    Sexual activity: Not Currently     Partners: Male   Social History Narrative    Working    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         Diet: well balanced, gets enough fruits/veggies    Exercise: runs    Sleep: ok, gets about 6 hrs    Stress: managable--good support        Periods: regular    Last pap 2013 normal    No heavy bleeding/cramping              PHYSICAL EXAMINATION:  /69   Pulse 92   Wt 157 lb (71.2 kg)   BMI 26.94 kg/m²   Physical  Exam  Constitutional:       Appearance: Normal appearance.   Abdominal:      Palpations: Abdomen is soft.      Tenderness: There is no abdominal tenderness.   Neurological:      Mental Status: She is alert.   Psychiatric:         Mood and Affect: Mood normal.         Behavior: Behavior normal.           OBSTETRIC ULTRASOUND  The patient had a level II ultrasound today which revealed size consistent with dates and a normal detailed anatomic survey.  Ultrasound Findings:  Single IUP in breech presentation.    Placenta is posterior, right.   A 3 vessel cord is noted.  Cardiac activity is present at 140 bpm   g ( 0 lb 15 oz);   MVP is 4.4 cm .     The fetal measurements are consistent with the established EDC. No ultrasound evidence of structural abnormalities are seen today. The nasal bone is present. No ultrasound evidence of markers for aneuploidy are seen. She understands that ultrasound exam cannot exclude genetic abnormalities and that genetic testing is recommended. The limitations of ultrasound were discussed.     Uterus and adnexa appeared normal  today on US  See PACS/Imaging Tab For Complete Ultrasound Report  I interpreted the results and reviewed them with the patient.    DISCUSSION  During her visit we discussed and reviewed the following issues:  ADVANCED MATERNAL AGE    Background  I reviewed with the patient that pregnancies in women of advanced maternal age (35 or older at delivery) are associated with elevated risks. Specifically, there is a higher rate of:  Fetal malformations  Preeclampsia  Gestational diabetes  Intrauterine fetal death    As a result, enhanced pregnancy surveillance is advised for these patients including a comprehensive ultrasound to assess for fetal malformations (at 20 weeks) and a third trimester ultrasound assessment for fetal growth (at 32 weeks). In addition, weekly NST's (initiating at 36 weeks gestation for women 35-39 years and at 32 weeks gestation for women 40  years and older) are also advised. Routine obstetric care is more than adequate to assess for gestational diabetes and preeclampsia; hence, no further significant alterations in obstetric care are advised.    Medical Complications    Women 35 years of age or older can expect to experience two to three fold higher rates of hospitalization,  delivery, and pregnancy-related complications when compared to their younger counterparts.  The two most common medical problems complicating these  pregnanccies are hypertension and diabetes.   The incidence of preeclampsia in the general obstetric population is 3 to 4 percent; this increases to 5 to 10 percent in women over age 40 and is as high as 35 percent in women over age 50.   The incidence of gestational diabetes in the general obstetric population is 3 percent, rising to 7 to 12 percent in women over age 40 and 20 percent in women over age 50.  Women 35 years of age or older are more likely to be delivered by . The  delivery rate in the general obstetric population of the [place] is almost 30 percent, compared to almost 50 percent in women over age 40 to 45 and almost 80 percent in women age 50 to 63.          Fetal Death        A decision analysis tool using data from the [place] [employer]  [employer] predicted a strategy of weekly antepartum testing and labor induction would lower the risk of unexplained fetal death in women 35 years of age or older. In this model, weekly testing starting at 36 weeks of gestation would drop the risk of fetal death from 5.2 to 1.3 per 1000 pregnancies. While a policy of antepartum testing in older women does increase the chance that a women will be induced (71 inductions per fetal death averted) and thereby increases her risk of having a  delivery, only 14 additional cesareans would need to be performed to avert one unexplained fetal death.  Hence, weekly NST's are advised for women of  advanced maternal age; testing should be initiated at 36 weeks for women 35-39 years and at 32 weeks for women 40 years and older.    Fetal Malformations    Cardiac malformations, clubfoot, and diaphragmatic hernia appear to occur with increased frequency in offspring of older women. These abnormalities are structural and unrelated to aneuploidy, thus they would not be detected by karyotype analysis.  For these reasons a complete, detailed ultrasound (level II) is advised even if the fetus has a normal karyotype.      Fetal Aneuploidy      Invasive Testing  I offered invasive genetic testing (amniocentesis, chorionic villus sampling) after reviewing the diagnostic accuracy of these tests as well as the procedure associated loss rate (1:500 for genetic amniocentesis).    She ultimately does not desire invasive genetic testing.     We discussed  the increased risk of chromosomal abnormalities associated with advanced maternal age at age  34 year old. She understands that ultrasound exam cannot exclude potential genetic abnormalities.  Her estimated risk based on maternal age at term with any chromosome abnormality is about 1: 200 and with Down Syndrome is about 1: 350.   We also discussed the risks and benefits of having  genetic testing (CVS and amniocentesis) performed.      Non-invasive Pregnancy Testing (NIPT)  I reviewed current non-invasive screening options. Currently non-invasive pregnancy testing (NIPT) offers the highest detection rate (with the lowest false positive rate) for the detection of fetal aneuploidy amongst high-risk patients. The limitations of detailed mid-trimester sonography was reviewed with the patient. First trimester screening and second trimester multiple-marker serum serum screening as alternative aneuploidy screening options were also reviewed. However, both of these tests are associated with lower detection and higher false positive rates.              The hemoglobinopathies can be  divided into two general types: the thalassemias and the hemoglobin variants (e.g., sickle cell anemia and hemoglobin C disease), or a combination of the two. Hemoglobinopathies are chronic, debilitating, and often fatal.  The lack of effective treatment underscores the need for prenatal testing for sickle cell disease and thalassemia.           In most fetal cases with a known hemoglobinopathy, prenatal testing does not alter obstetrical care, as these genetic disorders in the fetus typically have no untoward effects on the mother and fetus. One exception is homozygous alpha thalassemia (also known as hydrops fetalis) with the loss of all four alpha globin chains, which usually results in fetal death during the late second through mid-third trimester of pregnancy. This may also be associated with maternal health risks of hypertension. The use of intrauterine transfusion has resulted in successful live births.                      DNA-based testing for sickle cell, and alpha and beta thalassemia can be performed during the first trimester of pregnancy on villae obtained by chorionic villus sampling(CVS), or by amniocentesis.    Preimplantation genetic diagnosis is also available.        If the results of the maternal hemoglobin electrophoresis demonstrate that the mother is either homozygous or a heterozygote carrier for hemoglobinopathy, an evaluation of the father is needed to assess fetal risk. This includes obtaining a paternal CBC and hemoglobin electrophoresis. As indicated above, a MCV<80 fL in the absence of iron deficiency suggests thalassemia.                Beta thalassemia minor (beta thalassemia trait) requires no specific therapy. Transfusions are occasionally required in pregnant women who may develop a more severe \"physiologic\" anemia of pregnancy.    Patients with beta thalassemia intermedia must be monitored closely for progression of their anemia, and/or the development of worsening evidence of  the complications of hemolysis or extramedullary erythropoiesis. In the majority of these patients, chronic transfusion therapy will initially have to be implemented, occasionally as late as the third or fourth decade of life. Symptoms usually develop when the hemoglobin level falls below 7 g/dL, and transfusion may be required during periods of rapid growth, infection-associated aplastic crises, and pregnancy .   The level of iron stores in these patients should be monitored carefully by use of the serum ferritin.  Because the tannins in tea chelate iron in the gut and prevent its absorption, regular consumption of tea is encouraged.  With advances in hypertransfusion and iron chelation, some women with beta thalassemia major have had favorable pregnancy outcomes. However, such pregnancies are recommended only in those with normal cardiac function and adequate hypertransfusion and iron chelation regimens.    Both parents are carriers for Usher syndrome.  Their first daughter is affected and has cochlear implants.  She is doing quite well.  I offered amniocentesis for diagnostic testing based on AMA and family history of an inherited congenital hearing loss.  They declined amniocentesis.      IMPRESSION:  IUP at 20w6d   AMA Declines aneuploidy screening and invasive testing.  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 Moncada  Follow-up Growth ultrasound with BPP at 32 weeks.  Weekly NST's at 36 weeks.        Thank you for allowing me to participate in the care of your patient.  Please do not hesitate to contact me if additional questions or concerns arise.      Melinda Sanches M.D.    40 minutes spent in review of records, patient consultation, documentation and coordination of care.  The relevant clinical matter(s) are summarized above.     Note to patient and family  The 21st Century Cures Act makes medical notes  available to patients in the interest of transparency.  However, please be advised that this is a medical document.  It is intended as [id number] communication.  It is written and medical language may contain abbreviations or verbiage that are technical and unfamiliar.  It may appear blunt or direct.  Medical documents are intended to carry relevant information, facts as evident, and the clinical opinion of the practitioner.    Pt for level 2 US  Denies pregnancy complaints  States active fetus